# Patient Record
Sex: MALE | Race: WHITE | NOT HISPANIC OR LATINO | Employment: FULL TIME | URBAN - METROPOLITAN AREA
[De-identification: names, ages, dates, MRNs, and addresses within clinical notes are randomized per-mention and may not be internally consistent; named-entity substitution may affect disease eponyms.]

---

## 2019-06-10 ENCOUNTER — CLINICAL SUPPORT (OUTPATIENT)
Dept: OTHER | Facility: HOSPITAL | Age: 59
End: 2019-06-10

## 2019-06-10 ENCOUNTER — HOSPITAL ENCOUNTER (OUTPATIENT)
Dept: CT IMAGING | Facility: HOSPITAL | Age: 59
Discharge: HOME | End: 2019-06-10
Admitting: CLINICAL NURSE SPECIALIST

## 2019-06-10 DIAGNOSIS — Z87.891 HISTORY OF SMOKING 30 OR MORE PACK YEARS: ICD-10-CM

## 2019-06-10 DIAGNOSIS — Z12.2 SCREENING FOR MALIGNANT NEOPLASM OF RESPIRATORY ORGAN: ICD-10-CM

## 2019-06-10 DIAGNOSIS — Z12.2 SCREENING FOR MALIGNANT NEOPLASM OF RESPIRATORY ORGAN: Primary | ICD-10-CM

## 2019-06-10 PROCEDURE — G0296 VISIT TO DETERM LDCT ELIG: HCPCS | Performed by: CLINICAL NURSE SPECIALIST

## 2019-06-10 PROCEDURE — G0297 LDCT FOR LUNG CA SCREEN: HCPCS

## 2019-06-11 VITALS — HEIGHT: 60 IN | BODY MASS INDEX: 30.19 KG/M2 | WEIGHT: 153.8 LBS

## 2019-06-11 PROBLEM — Z87.891 FORMER CIGARETTE SMOKER: Status: ACTIVE | Noted: 2019-06-11

## 2019-06-11 NOTE — PROGRESS NOTES
[facility] Low-Dose Lung Cancer CT Screening Visit    Corby Martinez is a pleasant 59 y.o. male seen today at the request of Dr. Matthias Sheikh in our Lung Cancer Screening Program, being seen for Lung Cancer Screening Counseling and a Shared Decision Making Visit prior to Low-Dose Lung Cancer Screening CT exam.    SMOKING HISTORY:   Social History     Tobacco Use   Smoking Status Former Smoker   • Packs/day: 1.00   • Years: 36.00   • Pack years: 36.00   • Types: Cigarettes   • Last attempt to quit:    • Years since quittin.4   Tobacco Comment    Former smoker quit 8 years ago.  Began smoking at age 15 at 1ppd for 36 years.       Corby Martinez is a former smoker quitting 8 years ago with a 36 pack year history.  Reports no use of alternate forms of tobacco, electronic cigarettes, marijuana or other substances.  Based on the recommendation of the [place] Preventive Services Task Force, this patient is at high risk for lung cancer and a low-dose CT screening scan is recommended.     We discussed the connection between Radon and Lung Cancer and the availability of free test kits.  He has no basement in his home.  The patient reports occupational exposure to asbestos and various solvents and auto parts  in his work as a .  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  Some second-hand smoke exposure as a child with his father smoking in their home.  No current second-hand smoke exposure.    The patient has had no hemoptysis, unintentional weight loss or increasing shortness of breath. The patient is asymptomatic and has no signs or symptoms of lung cancer.   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  Additionally, reports no family history of lung cancer. He has no known chronic pulmonary disease.  We discussed findings of a CT chest on file from 2014  which showed no lung abnormalities.    Together we discussed the potential benefits and potential harms of being screened for lung cancer including the potential for follow-up diagnostic testing, risk for over diagnosis, false positive rate and total radiation exposure using the [facility] standardized decision aid.  We reviewed the patient's smoking history and counseled on the importance and health benefits of maintaining cigarette smoking abstinence.      Smoking Abstinence  DISCUSSION HELD TODAY:     We discussed that there are a number of resources and tobacco cessation interventions to assist with smoking cessation including the 1-800-Quit Now line, Health Department programs, [place] [facility] resources, and use of the [place] Department of Health and Human Services five keys for quitting and quit plan worksheet. On a scale of zero to ten, the patient rates their motivation to stay quit at a 10 out of 10 today.  The patient is confident that he will never smoke in the future.      Recommendations for continued lung cancer screening:      We discussed the NCCN guidelines for lung cancer screening and the patient verbalized understanding that annual screening is recommended until fifteen years beyond smoking as long as they have no other disease or comorbidity that would prevent them from receiving cancer treatments such as surgery should a lung cancer be detected. The [facility] Lung Cancer Screening Shared Decision-Making Tool was utilized as an aid in discussing whether or not screening was right. The patient has decided to proceed with a Low Dose Lung Cancer Screening CT today. The patient is aware that the results of his screening will be shared with the referring provider or PCP as well.       The patient verbalizes understanding that results of this low dose lung CT exam will be called and that assistance will be provided in arranging any necessary follow-up.    After review of  the NCCN guidelines and recommendations for ongoing screening, the patient verbalized understanding of recommendations for follow-up. We discussed the importance of adherence to continued annual screening until 15 years beyond smoking or until other life-limiting comorbidities are present. We discussed the impact of comorbidities and ability and willing to undergo diagnosis and treatment.    The patient was counseled on the continued health benefits of having quit tobacco, maintaining smoking abstinence, smoking cessation strategies and resources, as well as the importance of adherence to annual lung cancer low-dose CT screening.    Yamilet Jasmine, MSN, APRN, ACNS-BC, AOCN, CHPN  Clinical Nurse Specialist  [facility]

## 2019-06-11 NOTE — H&P (VIEW-ONLY)
[facility] Low-Dose Lung Cancer CT Screening Visit    Corby Martinez is a pleasant 59 y.o. male seen today at the request of Dr. Matthias Sheikh in our Lung Cancer Screening Program, being seen for Lung Cancer Screening Counseling and a Shared Decision Making Visit prior to Low-Dose Lung Cancer Screening CT exam.    SMOKING HISTORY:   Social History     Tobacco Use   Smoking Status Former Smoker   • Packs/day: 1.00   • Years: 36.00   • Pack years: 36.00   • Types: Cigarettes   • Last attempt to quit:    • Years since quittin.4   Tobacco Comment    Former smoker quit 8 years ago.  Began smoking at age 15 at 1ppd for 36 years.       Corby Martinez is a former smoker quitting 8 years ago with a 36 pack year history.  Reports no use of alternate forms of tobacco, electronic cigarettes, marijuana or other substances.  Based on the recommendation of the [place] Preventive Services Task Force, this patient is at high risk for lung cancer and a low-dose CT screening scan is recommended.     We discussed the connection between Radon and Lung Cancer and the availability of free test kits.  He has no basement in his home.  The patient reports occupational exposure to asbestos and various solvents and auto parts  in his work as a .  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  Some second-hand smoke exposure as a child with his father smoking in their home.  No current second-hand smoke exposure.    The patient has had no hemoptysis, unintentional weight loss or increasing shortness of breath. The patient is asymptomatic and has no signs or symptoms of lung cancer.   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  Additionally, reports no family history of lung cancer. He has no known chronic pulmonary disease.  We discussed findings of a CT chest on file from 2014  which showed no lung abnormalities.    Together we discussed the potential benefits and potential harms of being screened for lung cancer including the potential for follow-up diagnostic testing, risk for over diagnosis, false positive rate and total radiation exposure using the [facility] standardized decision aid.  We reviewed the patient's smoking history and counseled on the importance and health benefits of maintaining cigarette smoking abstinence.      Smoking Abstinence  DISCUSSION HELD TODAY:     We discussed that there are a number of resources and tobacco cessation interventions to assist with smoking cessation including the 1-800-Quit Now line, Health Department programs, [place] [facility] resources, and use of the [place] Department of Health and Human Services five keys for quitting and quit plan worksheet. On a scale of zero to ten, the patient rates their motivation to stay quit at a 10 out of 10 today.  The patient is confident that he will never smoke in the future.      Recommendations for continued lung cancer screening:      We discussed the NCCN guidelines for lung cancer screening and the patient verbalized understanding that annual screening is recommended until fifteen years beyond smoking as long as they have no other disease or comorbidity that would prevent them from receiving cancer treatments such as surgery should a lung cancer be detected. The [facility] Lung Cancer Screening Shared Decision-Making Tool was utilized as an aid in discussing whether or not screening was right. The patient has decided to proceed with a Low Dose Lung Cancer Screening CT today. The patient is aware that the results of his screening will be shared with the referring provider or PCP as well.       The patient verbalizes understanding that results of this low dose lung CT exam will be called and that assistance will be provided in arranging any necessary follow-up.    After review of  the NCCN guidelines and recommendations for ongoing screening, the patient verbalized understanding of recommendations for follow-up. We discussed the importance of adherence to continued annual screening until 15 years beyond smoking or until other life-limiting comorbidities are present. We discussed the impact of comorbidities and ability and willing to undergo diagnosis and treatment.    The patient was counseled on the continued health benefits of having quit tobacco, maintaining smoking abstinence, smoking cessation strategies and resources, as well as the importance of adherence to annual lung cancer low-dose CT screening.    Yamilet Jasmine, MSN, APRN, ACNS-BC, AOCN, CHPN  Clinical Nurse Specialist  [facility]

## 2019-06-12 ENCOUNTER — OFFICE VISIT (OUTPATIENT)
Dept: OTHER | Facility: HOSPITAL | Age: 59
End: 2019-06-12

## 2019-06-12 VITALS
BODY MASS INDEX: 29.88 KG/M2 | HEIGHT: 60 IN | OXYGEN SATURATION: 100 % | DIASTOLIC BLOOD PRESSURE: 96 MMHG | WEIGHT: 152.2 LBS | HEART RATE: 58 BPM | RESPIRATION RATE: 16 BRPM | SYSTOLIC BLOOD PRESSURE: 155 MMHG | TEMPERATURE: 98 F

## 2019-06-12 DIAGNOSIS — R91.1 LUNG NODULE: Primary | ICD-10-CM

## 2019-06-12 DIAGNOSIS — Z87.891 FORMER CIGARETTE SMOKER: ICD-10-CM

## 2019-06-12 PROBLEM — C44.629 SQUAMOUS CELL CARCINOMA OF SKIN OF LEFT HAND: Status: ACTIVE | Noted: 2019-06-12

## 2019-06-12 PROCEDURE — G0463 HOSPITAL OUTPT CLINIC VISIT: HCPCS

## 2019-06-12 PROCEDURE — 99204 OFFICE O/P NEW MOD 45 MIN: CPT | Performed by: THORACIC SURGERY (CARDIOTHORACIC VASCULAR SURGERY)

## 2019-06-12 RX ORDER — LOSARTAN POTASSIUM 50 MG/1
50 TABLET ORAL DAILY
Refills: 0 | COMMUNITY
Start: 2019-05-22 | End: 2021-03-01

## 2019-06-12 RX ORDER — MONTELUKAST SODIUM 10 MG/1
TABLET ORAL NIGHTLY
COMMUNITY
Start: 2019-05-14 | End: 2021-03-31

## 2019-06-12 RX ORDER — ROSUVASTATIN CALCIUM 10 MG/1
10 TABLET, COATED ORAL NIGHTLY
COMMUNITY
Start: 2019-05-14

## 2019-06-12 RX ORDER — HYDROCHLOROTHIAZIDE 25 MG/1
TABLET ORAL DAILY
Refills: 2 | COMMUNITY
Start: 2019-04-30 | End: 2021-08-11

## 2019-06-12 NOTE — PROGRESS NOTES
Subjective   Patient ID: Corby Martinez is a 59 y.o. male is being seen for consultation today at the request of Matthias Sheikh MD    History of Present Illness  Dear Colleague,  Corby Martinez was seen in our multidisciplinary lung cancer clinic today in consultation for a newly discovered right upper lobe lung nodule.  Mr. Martinez is a pleasant 59-year-old gentleman with an approximately 40-pack-year history of tobacco use who presented for low-dose CT screening secondary to his history.  On his low-dose CT scan, there is a 1.8 cm right upper lobe nodule concerning for bronchogenic malignancy.      Mr. Martinez has a history of a squamous cell carcinoma of the skin on his left hand.  He also has a family history of squamous cell carcinoma of the skin.  He otherwise has hypertension treated with medication.  He is still employed doing maintenance.  He denies short of breath but states with some exertion he becomes short of breath.  He has had a recent 5 pound weight loss.    The following portions of the patient's history were reviewed and updated as appropriate: allergies, current medications, past family history, past medical history, past social history, past surgical history and problem list.  Review of Systems   Constitution: Positive for weight loss.   HENT: Positive for congestion and tinnitus.    Eyes: Negative.    Cardiovascular: Negative.    Respiratory: Positive for snoring.    Endocrine: Negative.    Hematologic/Lymphatic: Negative.    Skin: Positive for skin cancer.   Musculoskeletal: Positive for back pain.   Gastrointestinal: Negative.    Genitourinary: Positive for urgency.   Neurological: Positive for light-headedness.   Psychiatric/Behavioral: Negative.    Allergic/Immunologic: Positive for environmental allergies.   All other systems reviewed and are negative.    Patient Active Problem List   Diagnosis   • Former cigarette smoker   • Squamous cell carcinoma of skin of left hand     Past  Medical History:   Diagnosis Date   • Hyperlipidemia    • Hypertension    • Lung mass    • Pneumonia      Past Surgical History:   Procedure Laterality Date   • HERNIA REPAIR       Family History   Problem Relation Age of Onset   • Skin cancer Mother      Social History     Socioeconomic History   • Marital status:      Spouse name: Not on file   • Number of children: Not on file   • Years of education: Not on file   • Highest education level: Not on file   Tobacco Use   • Smoking status: Former Smoker     Packs/day: 1.00     Years: 36.00     Pack years: 36.00     Types: Cigarettes     Last attempt to quit:      Years since quittin.4   • Tobacco comment: Former smoker quit 8 years ago.  Began smoking at age 15 at 1ppd for 36 years.   Substance and Sexual Activity   • Alcohol use: Yes     Alcohol/week: 10.8 oz     Types: 18 Cans of beer per week     Frequency: 4 or more times a week     Drinks per session: 3 or 4     Binge frequency: Never     Comment: Reports 18 beers weekly.   • Drug use: No   • Sexual activity: Defer       Current Outpatient Medications:   •  hydrochlorothiazide (HYDRODIURIL) 25 MG tablet, , Disp: , Rfl: 2  •  losartan (COZAAR) 50 MG tablet, Take 50 mg by mouth Daily., Disp: , Rfl: 0  •  montelukast (SINGULAIR) 10 MG tablet, , Disp: , Rfl:   •  rosuvastatin (CRESTOR) 10 MG tablet, , Disp: , Rfl:   No Known Allergies     Objective   Vitals:    19 0940   BP: 155/96   Pulse: 58   Resp: 16   Temp: 98 °F (36.7 °C)   SpO2: 100%     Physical Exam   Constitutional: He is oriented to person, place, and time. He appears well-developed and well-nourished.   HENT:   Head: Normocephalic and atraumatic.   Nose: Nose normal.   Mouth/Throat: Oropharynx is clear and moist.   Eyes: Conjunctivae and EOM are normal. Pupils are equal, round, and reactive to light.   Neck: Normal range of motion. Neck supple.   Cardiovascular: Normal rate, regular rhythm, normal heart sounds and intact distal  pulses.   Pulmonary/Chest: Effort normal and breath sounds normal.   Abdominal: Soft. Bowel sounds are normal.   Musculoskeletal: Normal range of motion.   Neurological: He is alert and oriented to person, place, and time.   Skin: Skin is warm and dry. Capillary refill takes less than 2 seconds.   Psychiatric: He has a normal mood and affect. His behavior is normal. Judgment and thought content normal.   Nursing note and vitals reviewed.    Independent Review of Radiographic Studies:    I have independently reviewed the CT chest performed on 6/10/2019 which demonstrates a 1.8 x 1.6 right upper lobe lung nodule with spiculated borders concerning for bronchogenic malignancy.  There is moderate emphysema.  No effusion, no hilar or mediastinal lymphadenopathy.  Assessment/Plan   Mr. Martinez is a pleasant 59-year-old gentleman with a newly discovered right upper lobe lung mass concerning for bronchogenic malignancy.  He is reasonably healthy and given its appearance on low-dose screening CT scan, this will likely represent an early stage malignancy.  He will need a CT guided biopsy of this lesion to provide a diagnosis as well as a PET CT scan to evaluate for metastatic disease.  I would also like to obtain PFTs to better risk stratify him for a possible surgical resection.  He will also need a stress echo for risk stratification.  Once he is completed all of the studies, he will plan to come back and see me in clinic to discuss further treatment options.        Diagnoses and all orders for this visit:    Lung nodule  -     NM Pet Skull Base To Mid Thigh; Future  -     Full Pulmonary Function Test With Bronchodilator; Future  -     Adult Stress Echo W/ Cont or Stress Agent if Necessary Per Protocol; Future  -     CT Biopsy Lung Mediastinum Right; Future  -     Tissue Pathology Exam; Standing    Former cigarette smoker    Other orders  -     rosuvastatin (CRESTOR) 10 MG tablet  -     montelukast (SINGULAIR) 10 MG  tablet  -     losartan (COZAAR) 50 MG tablet; Take 50 mg by mouth Daily.  -     hydrochlorothiazide (HYDRODIURIL) 25 MG tablet

## 2019-06-13 NOTE — PATIENT INSTRUCTIONS
Pt seen by Dr. Lee and will be scheduled for PFTs/CTneedle bx/PET scan and stress test. Pt given instructions for PFTs , CT needle bx/ PET scan and stress test.  Pt instructed to call nurse navigator with any further questions or concerns. Pt given contact cards for Dr. Lee and nurse navigator and will return to clinic when tests are completed.

## 2019-06-18 ENCOUNTER — HOSPITAL ENCOUNTER (OUTPATIENT)
Dept: PET IMAGING | Facility: HOSPITAL | Age: 59
Discharge: HOME | End: 2019-06-18

## 2019-06-18 ENCOUNTER — HOSPITAL ENCOUNTER (OUTPATIENT)
Dept: RESPIRATORY THERAPY | Facility: HOSPITAL | Age: 59
Discharge: HOME | End: 2019-06-18
Admitting: THORACIC SURGERY (CARDIOTHORACIC VASCULAR SURGERY)

## 2019-06-18 DIAGNOSIS — R91.1 LUNG NODULE: ICD-10-CM

## 2019-06-18 DIAGNOSIS — R06.09 DYSPNEA ON EXERTION: Primary | ICD-10-CM

## 2019-06-18 DIAGNOSIS — Z01.818 PRE-OPERATIVE CLEARANCE: ICD-10-CM

## 2019-06-18 LAB
BDY SITE: NORMAL
GLUCOSE BLDC GLUCOMTR-MCNC: 89 MG/DL (ref 70–130)
HGB BLDA-MCNC: 15 G/DL

## 2019-06-18 PROCEDURE — A9552 F18 FDG: HCPCS | Performed by: THORACIC SURGERY (CARDIOTHORACIC VASCULAR SURGERY)

## 2019-06-18 PROCEDURE — 82820 HEMOGLOBIN-OXYGEN AFFINITY: CPT | Performed by: THORACIC SURGERY (CARDIOTHORACIC VASCULAR SURGERY)

## 2019-06-18 PROCEDURE — 78815 PET IMAGE W/CT SKULL-THIGH: CPT

## 2019-06-18 PROCEDURE — 94726 PLETHYSMOGRAPHY LUNG VOLUMES: CPT

## 2019-06-18 PROCEDURE — 94640 AIRWAY INHALATION TREATMENT: CPT

## 2019-06-18 PROCEDURE — 82962 GLUCOSE BLOOD TEST: CPT

## 2019-06-18 PROCEDURE — 0 FLUDEOXYGLUCOSE F18 SOLUTION: Performed by: THORACIC SURGERY (CARDIOTHORACIC VASCULAR SURGERY)

## 2019-06-18 PROCEDURE — 94060 EVALUATION OF WHEEZING: CPT

## 2019-06-18 PROCEDURE — 94729 DIFFUSING CAPACITY: CPT

## 2019-06-18 RX ORDER — ALBUTEROL SULFATE 2.5 MG/3ML
2.5 SOLUTION RESPIRATORY (INHALATION) ONCE
Status: COMPLETED | OUTPATIENT
Start: 2019-06-18 | End: 2019-06-18

## 2019-06-18 RX ADMIN — FLUDEOXYGLUCOSE F18 1 DOSE: 300 INJECTION INTRAVENOUS at 08:50

## 2019-06-18 RX ADMIN — ALBUTEROL SULFATE 2.5 MG: 2.5 SOLUTION RESPIRATORY (INHALATION) at 11:25

## 2019-06-20 ENCOUNTER — HOSPITAL ENCOUNTER (OUTPATIENT)
Dept: CARDIOLOGY | Facility: HOSPITAL | Age: 59
Discharge: HOME | End: 2019-06-20
Admitting: THORACIC SURGERY (CARDIOTHORACIC VASCULAR SURGERY)

## 2019-06-20 ENCOUNTER — APPOINTMENT (OUTPATIENT)
Dept: CARDIOLOGY | Facility: HOSPITAL | Age: 59
End: 2019-06-20

## 2019-06-20 VITALS — HEIGHT: 69 IN | BODY MASS INDEX: 22.51 KG/M2 | WEIGHT: 152 LBS

## 2019-06-20 DIAGNOSIS — R91.1 LUNG NODULE: ICD-10-CM

## 2019-06-20 DIAGNOSIS — Z01.818 PRE-OPERATIVE CLEARANCE: ICD-10-CM

## 2019-06-20 DIAGNOSIS — R06.09 DYSPNEA ON EXERTION: ICD-10-CM

## 2019-06-20 LAB
BH CV ECHO MEAS - ACS: 1.9 CM
BH CV ECHO MEAS - AO MAX PG (FULL): 4.5 MMHG
BH CV ECHO MEAS - AO MAX PG: 8.1 MMHG
BH CV ECHO MEAS - AO MEAN PG (FULL): 2 MMHG
BH CV ECHO MEAS - AO MEAN PG: 4 MMHG
BH CV ECHO MEAS - AO ROOT AREA (BSA CORRECTED): 1.6
BH CV ECHO MEAS - AO ROOT AREA: 6.4 CM^2
BH CV ECHO MEAS - AO ROOT DIAM: 2.9 CM
BH CV ECHO MEAS - AO V2 MAX: 142 CM/SEC
BH CV ECHO MEAS - AO V2 MEAN: 88.3 CM/SEC
BH CV ECHO MEAS - AO V2 VTI: 30 CM
BH CV ECHO MEAS - AVA(I,A): 2.1 CM^2
BH CV ECHO MEAS - AVA(I,D): 2.1 CM^2
BH CV ECHO MEAS - AVA(V,A): 1.9 CM^2
BH CV ECHO MEAS - AVA(V,D): 1.9 CM^2
BH CV ECHO MEAS - BSA(HAYCOCK): 1.8 M^2
BH CV ECHO MEAS - BSA: 1.8 M^2
BH CV ECHO MEAS - BZI_BMI: 22.4 KILOGRAMS/M^2
BH CV ECHO MEAS - BZI_METRIC_HEIGHT: 175.3 CM
BH CV ECHO MEAS - BZI_METRIC_WEIGHT: 68.9 KG
BH CV ECHO MEAS - EDV(CUBED): 65.5 ML
BH CV ECHO MEAS - EDV(MOD-SP2): 82.5 ML
BH CV ECHO MEAS - EDV(MOD-SP4): 82.7 ML
BH CV ECHO MEAS - EDV(TEICH): 71.3 ML
BH CV ECHO MEAS - EF(CUBED): 67.9 %
BH CV ECHO MEAS - EF(MOD-BP): 61 %
BH CV ECHO MEAS - EF(MOD-SP2): 69 %
BH CV ECHO MEAS - EF(MOD-SP4): 59 %
BH CV ECHO MEAS - EF(TEICH): 60 %
BH CV ECHO MEAS - ESV(CUBED): 21 ML
BH CV ECHO MEAS - ESV(MOD-SP2): 25.6 ML
BH CV ECHO MEAS - ESV(MOD-SP4): 19 ML
BH CV ECHO MEAS - ESV(TEICH): 28.5 ML
BH CV ECHO MEAS - FS: 31.5 %
BH CV ECHO MEAS - IVS/LVPW: 1.1
BH CV ECHO MEAS - IVSD: 0.92 CM
BH CV ECHO MEAS - LA DIMENSION: 2.5 CM
BH CV ECHO MEAS - LA/AO: 0.88
BH CV ECHO MEAS - LAT PEAK E' VEL: 8 CM/SEC
BH CV ECHO MEAS - LV DIASTOLIC VOL/BSA (35-75): 45 ML/M^2
BH CV ECHO MEAS - LV MASS(C)D: 105.9 GRAMS
BH CV ECHO MEAS - LV MASS(C)DI: 57.6 GRAMS/M^2
BH CV ECHO MEAS - LV MAX PG: 3.6 MMHG
BH CV ECHO MEAS - LV MEAN PG: 2 MMHG
BH CV ECHO MEAS - LV SYSTOLIC VOL/BSA (12-30): 10.3 ML/M^2
BH CV ECHO MEAS - LV V1 MAX: 94.6 CM/SEC
BH CV ECHO MEAS - LV V1 MEAN: 60.4 CM/SEC
BH CV ECHO MEAS - LV V1 VTI: 21.8 CM
BH CV ECHO MEAS - LVIDD: 4 CM
BH CV ECHO MEAS - LVIDS: 2.8 CM
BH CV ECHO MEAS - LVLD AP2: 5.9 CM
BH CV ECHO MEAS - LVLD AP4: 6.8 CM
BH CV ECHO MEAS - LVLS AP2: 5.7 CM
BH CV ECHO MEAS - LVLS AP4: 5.1 CM
BH CV ECHO MEAS - LVOT AREA (M): 2.8 CM^2
BH CV ECHO MEAS - LVOT AREA: 2.8 CM^2
BH CV ECHO MEAS - LVOT DIAM: 1.9 CM
BH CV ECHO MEAS - LVPWD: 0.82 CM
BH CV ECHO MEAS - MED PEAK E' VEL: 8 CM/SEC
BH CV ECHO MEAS - MV A DUR: 0.11 SEC
BH CV ECHO MEAS - MV A MAX VEL: 59.2 CM/SEC
BH CV ECHO MEAS - MV DEC SLOPE: 343 CM/SEC^2
BH CV ECHO MEAS - MV DEC TIME: 0.18 SEC
BH CV ECHO MEAS - MV E MAX VEL: 67.4 CM/SEC
BH CV ECHO MEAS - MV E/A: 1.1
BH CV ECHO MEAS - MV MAX PG: 3.4 MMHG
BH CV ECHO MEAS - MV MEAN PG: 1 MMHG
BH CV ECHO MEAS - MV P1/2T MAX VEL: 93.7 CM/SEC
BH CV ECHO MEAS - MV P1/2T: 80 MSEC
BH CV ECHO MEAS - MV V2 MAX: 91.8 CM/SEC
BH CV ECHO MEAS - MV V2 MEAN: 55.4 CM/SEC
BH CV ECHO MEAS - MV V2 VTI: 28.2 CM
BH CV ECHO MEAS - MVA P1/2T LCG: 2.3 CM^2
BH CV ECHO MEAS - MVA(P1/2T): 2.7 CM^2
BH CV ECHO MEAS - MVA(VTI): 2.2 CM^2
BH CV ECHO MEAS - PA ACC TIME: 0.16 SEC
BH CV ECHO MEAS - PA MAX PG (FULL): 1.5 MMHG
BH CV ECHO MEAS - PA MAX PG: 3.1 MMHG
BH CV ECHO MEAS - PA PR(ACCEL): 7.9 MMHG
BH CV ECHO MEAS - PA V2 MAX: 88.2 CM/SEC
BH CV ECHO MEAS - PULM A REVS DUR: 0.11 SEC
BH CV ECHO MEAS - PULM A REVS VEL: 32.5 CM/SEC
BH CV ECHO MEAS - PULM DIAS VEL: 36.9 CM/SEC
BH CV ECHO MEAS - PULM S/D: 0.64
BH CV ECHO MEAS - PULM SYS VEL: 23.8 CM/SEC
BH CV ECHO MEAS - PVA(V,A): 2 CM^2
BH CV ECHO MEAS - PVA(V,D): 2 CM^2
BH CV ECHO MEAS - QP/QS: 0.58
BH CV ECHO MEAS - RAP SYSTOLE: 3 MMHG
BH CV ECHO MEAS - RV MAX PG: 1.6 MMHG
BH CV ECHO MEAS - RV MEAN PG: 1 MMHG
BH CV ECHO MEAS - RV V1 MAX: 63.5 CM/SEC
BH CV ECHO MEAS - RV V1 MEAN: 41.8 CM/SEC
BH CV ECHO MEAS - RV V1 VTI: 12.6 CM
BH CV ECHO MEAS - RVOT AREA: 2.8 CM^2
BH CV ECHO MEAS - RVOT DIAM: 1.9 CM
BH CV ECHO MEAS - RVSP: 25 MMHG
BH CV ECHO MEAS - SI(AO): 104.1 ML/M^2
BH CV ECHO MEAS - SI(CUBED): 24.2 ML/M^2
BH CV ECHO MEAS - SI(LVOT): 33.6 ML/M^2
BH CV ECHO MEAS - SI(MOD-SP2): 31 ML/M^2
BH CV ECHO MEAS - SI(MOD-SP4): 34.7 ML/M^2
BH CV ECHO MEAS - SI(TEICH): 23.2 ML/M^2
BH CV ECHO MEAS - SV(AO): 191.4 ML
BH CV ECHO MEAS - SV(CUBED): 44.4 ML
BH CV ECHO MEAS - SV(LVOT): 61.8 ML
BH CV ECHO MEAS - SV(MOD-SP2): 56.9 ML
BH CV ECHO MEAS - SV(MOD-SP4): 63.7 ML
BH CV ECHO MEAS - SV(RVOT): 35.7 ML
BH CV ECHO MEAS - SV(TEICH): 42.7 ML
BH CV ECHO MEAS - TAPSE (>1.6): 2.6 CM2
BH CV ECHO MEAS - TR MAX VEL: 246.5 CM/SEC
BH CV ECHO MEASUREMENTS AVERAGE E/E' RATIO: 8.43
BH CV STRESS BP STAGE 1: NORMAL
BH CV STRESS BP STAGE 2: NORMAL
BH CV STRESS BP STAGE 3: NORMAL
BH CV STRESS DURATION MIN STAGE 1: 3
BH CV STRESS DURATION MIN STAGE 2: 3
BH CV STRESS DURATION MIN STAGE 3: 1
BH CV STRESS DURATION SEC STAGE 1: 0
BH CV STRESS DURATION SEC STAGE 2: 0
BH CV STRESS DURATION SEC STAGE 3: 45
BH CV STRESS ECHO POST STRESS EJECTION FRACTION EF: 74 %
BH CV STRESS GRADE STAGE 1: 10
BH CV STRESS GRADE STAGE 2: 12
BH CV STRESS GRADE STAGE 3: 14
BH CV STRESS HR STAGE 1: 92
BH CV STRESS HR STAGE 2: 120
BH CV STRESS HR STAGE 3: 156
BH CV STRESS METS STAGE 1: 5
BH CV STRESS METS STAGE 2: 7.5
BH CV STRESS METS STAGE 3: 10
BH CV STRESS PROTOCOL 1: NORMAL
BH CV STRESS RECOVERY BP: NORMAL MMHG
BH CV STRESS RECOVERY HR: 80 BPM
BH CV STRESS SPEED STAGE 1: 1.7
BH CV STRESS SPEED STAGE 2: 2.5
BH CV STRESS SPEED STAGE 3: 3.4
BH CV STRESS STAGE 1: 1
BH CV STRESS STAGE 2: 2
BH CV STRESS STAGE 3: 3
BH CV VAS BP RIGHT ARM: NORMAL MMHG
BH CV XLRA - RV BASE: 4.3 CM
BH CV XLRA - TDI S': 13 CM/SEC
LEFT ATRIUM VOLUME INDEX: 19 ML/M2
LV EF 2D ECHO EST: 61 %
MAXIMAL PREDICTED HEART RATE: 160 BPM
PERCENT MAX PREDICTED HR: 97.5 %
STRESS BASELINE BP: NORMAL MMHG
STRESS BASELINE HR: 61 BPM
STRESS O2 SAT REST: 100 %
STRESS PERCENT HR: 115 %
STRESS POST ESTIMATED WORKLOAD: 9.8 METS
STRESS POST EXERCISE DUR MIN: 7 MIN
STRESS POST EXERCISE DUR SEC: 45 SEC
STRESS POST O2 SAT PEAK: 100 %
STRESS POST PEAK BP: NORMAL MMHG
STRESS POST PEAK HR: 156 BPM
STRESS TARGET HR: 136 BPM

## 2019-06-20 PROCEDURE — 93350 STRESS TTE ONLY: CPT | Performed by: INTERNAL MEDICINE

## 2019-06-20 PROCEDURE — 93320 DOPPLER ECHO COMPLETE: CPT

## 2019-06-20 PROCEDURE — 93017 CV STRESS TEST TRACING ONLY: CPT

## 2019-06-20 PROCEDURE — 93016 CV STRESS TEST SUPVJ ONLY: CPT | Performed by: INTERNAL MEDICINE

## 2019-06-20 PROCEDURE — 93325 DOPPLER ECHO COLOR FLOW MAPG: CPT

## 2019-06-20 PROCEDURE — 93018 CV STRESS TEST I&R ONLY: CPT | Performed by: INTERNAL MEDICINE

## 2019-06-20 PROCEDURE — 93350 STRESS TTE ONLY: CPT

## 2019-06-20 PROCEDURE — 93325 DOPPLER ECHO COLOR FLOW MAPG: CPT | Performed by: INTERNAL MEDICINE

## 2019-06-20 PROCEDURE — 25010000002 PERFLUTREN (DEFINITY) 8.476 MG IN SODIUM CHLORIDE 0.9 % 10 ML INJECTION: Performed by: THORACIC SURGERY (CARDIOTHORACIC VASCULAR SURGERY)

## 2019-06-20 PROCEDURE — 93352 ADMIN ECG CONTRAST AGENT: CPT | Performed by: INTERNAL MEDICINE

## 2019-06-20 PROCEDURE — 93320 DOPPLER ECHO COMPLETE: CPT | Performed by: INTERNAL MEDICINE

## 2019-06-20 RX ADMIN — PERFLUTREN 5 ML: 6.52 INJECTION, SUSPENSION INTRAVENOUS at 09:20

## 2019-06-24 ENCOUNTER — HOSPITAL ENCOUNTER (OUTPATIENT)
Dept: GENERAL RADIOLOGY | Facility: HOSPITAL | Age: 59
Discharge: HOME | End: 2019-06-24

## 2019-06-24 ENCOUNTER — HOSPITAL ENCOUNTER (OUTPATIENT)
Dept: CT IMAGING | Facility: HOSPITAL | Age: 59
Discharge: HOME | End: 2019-06-24
Admitting: THORACIC SURGERY (CARDIOTHORACIC VASCULAR SURGERY)

## 2019-06-24 VITALS
OXYGEN SATURATION: 96 % | WEIGHT: 165 LBS | SYSTOLIC BLOOD PRESSURE: 129 MMHG | RESPIRATION RATE: 16 BRPM | TEMPERATURE: 97.2 F | HEART RATE: 57 BPM | BODY MASS INDEX: 24.44 KG/M2 | HEIGHT: 69 IN | DIASTOLIC BLOOD PRESSURE: 74 MMHG

## 2019-06-24 DIAGNOSIS — R91.1 LUNG NODULE: ICD-10-CM

## 2019-06-24 LAB
INR PPP: 1 (ref 0.8–1.2)
PROTHROMBIN TIME: 12.5 SECONDS (ref 12.8–15.2)

## 2019-06-24 PROCEDURE — 77012 CT SCAN FOR NEEDLE BIOPSY: CPT

## 2019-06-24 PROCEDURE — 71045 X-RAY EXAM CHEST 1 VIEW: CPT

## 2019-06-24 PROCEDURE — 88305 TISSUE EXAM BY PATHOLOGIST: CPT | Performed by: THORACIC SURGERY (CARDIOTHORACIC VASCULAR SURGERY)

## 2019-06-24 PROCEDURE — 85610 PROTHROMBIN TIME: CPT

## 2019-06-24 RX ORDER — SODIUM CHLORIDE 0.9 % (FLUSH) 0.9 %
3 SYRINGE (ML) INJECTION EVERY 12 HOURS SCHEDULED
Status: DISCONTINUED | OUTPATIENT
Start: 2019-06-24 | End: 2019-06-25 | Stop reason: HOSPADM

## 2019-06-24 RX ORDER — SODIUM CHLORIDE 0.9 % (FLUSH) 0.9 %
3-10 SYRINGE (ML) INJECTION AS NEEDED
Status: DISCONTINUED | OUTPATIENT
Start: 2019-06-24 | End: 2019-06-25 | Stop reason: HOSPADM

## 2019-06-24 RX ORDER — SODIUM CHLORIDE 9 MG/ML
500 INJECTION, SOLUTION INTRAVENOUS CONTINUOUS
Status: DISCONTINUED | OUTPATIENT
Start: 2019-06-24 | End: 2019-06-25 | Stop reason: HOSPADM

## 2019-06-24 RX ORDER — ACETAMINOPHEN 325 MG/1
650 TABLET ORAL ONCE
Status: COMPLETED | OUTPATIENT
Start: 2019-06-24 | End: 2019-06-24

## 2019-06-24 RX ORDER — SODIUM CHLORIDE 0.9 % (FLUSH) 0.9 %
1-10 SYRINGE (ML) INJECTION AS NEEDED
Status: DISCONTINUED | OUTPATIENT
Start: 2019-06-24 | End: 2019-06-25 | Stop reason: HOSPADM

## 2019-06-24 RX ORDER — HYDROCODONE BITARTRATE AND ACETAMINOPHEN 5; 325 MG/1; MG/1
1 TABLET ORAL ONCE AS NEEDED
Status: COMPLETED | OUTPATIENT
Start: 2019-06-24 | End: 2019-06-24

## 2019-06-24 RX ORDER — LIDOCAINE HYDROCHLORIDE 20 MG/ML
10 INJECTION, SOLUTION INFILTRATION; PERINEURAL ONCE
Status: COMPLETED | OUTPATIENT
Start: 2019-06-24 | End: 2019-06-24

## 2019-06-24 RX ADMIN — ACETAMINOPHEN 650 MG: 325 TABLET ORAL at 11:50

## 2019-06-24 RX ADMIN — LIDOCAINE HYDROCHLORIDE 10 ML: 20 INJECTION, SOLUTION INFILTRATION; PERINEURAL at 11:41

## 2019-06-24 RX ADMIN — HYDROCODONE BITARTATE AND ACETAMINOPHEN 1 TABLET: 5; 325 TABLET ORAL at 13:10

## 2019-06-24 RX ADMIN — SODIUM CHLORIDE 500 ML: 0.9 INJECTION, SOLUTION INTRAVENOUS at 12:00

## 2019-06-24 NOTE — INTERVAL H&P NOTE
H&P reviewed. The patient was examined and there are no changes to the H&P.   Risks discussed included but not limited to pain, bleeding, infection, damaging surrounding structures (including pneumothorax requiring a chest tube and hospital admission and which could result in cardiopulmonary arrest) and need for further procedures/non diagnostic biopsy. Denied anticoagulation

## 2019-06-24 NOTE — DISCHARGE INSTRUCTIONS
EDUCATION /DISCHARGE INSTRUCTIONS  CT/US guided biopsy:  A biopsy is a procedure done to remove tissue for further analysis.  Before images are taken to locate the target area.  Images can be obtained using ultrasound, CT or MRI.  A physician will clean your skin with antiseptic soap, place a sterile towel around the site and administer a local anesthetic to numb the area.  The physician will then insert a special needle.  Sometimes images are taken of the needle after it is inserted to ensure the needle is in the correct area to be biopsied.   A sample is obtained and sent to the laboratory for study.  Occasionally the laboratory is unable to make a diagnosis from the sample and the procedure may need to be repeated.  Within a week the radiologist will send a report to your physician.  A pathologist will also examine the tissue and send a report.      Risks of the procedure include but are not limited to:   *  Bleeding    *  Infection   *  Puncture of surrounding organs *  Death     *  Lung collapse if the biopsy is near the chest which may require insertion of a       tube to re-inflate the lung if severe.      Benefits of the procedure:  Using x-ray helps to locate the area that requires a biopsy. The procedure is less invasive than a surgical procedure, there are no large incisions and it does not require anesthesia.      Alternatives to the procedure:  A biopsy can be performed surgically.  Risks of a surgical biopsy include exposure to anesthesia, infection, excessive bleeding and injury to abdominal organs.  A benefit of surgical biopsy is the ability to see the area to be biopsied and remove of a larger piece of tissue.    THIS EDUCATION INFORMATION WAS REVIEWED PRIOR TO PROCEDURE AND CONSENT. Patient initials__________________Time________1040___________    Post Procedure:    *  Expect the biopsy site may be tender up to one week.    *  Rest today (no pushing pulling or straining).   *  Slowly increase  activity tomorrow.    *  If you received sedation do not drive for 24 hours.   *  Keep dressing clean and dry.   *  Leave dressing on puncture site for 24 hours.    *  You may shower when dressing removed.    Call your doctor if experiencing:   *  Signs of infection such as redness, swelling, excessive pain and / or foul        smelling drainage from the puncture site.   *  Chills or fever over 101 degrees (by mouth).   *  Unrelieved pain.   *  Any new or severe symptoms.   *  If experiencing sudden / severe shortness of breath or chest pain go to the       nearest emergency room.     Following the procedure:     Follow-up with the ordering physician as directed.    Continue to take other medications as directed by your physician unless    otherwise instructed.    If you have any concerns please call the Radiology Nurses Desk at [phone].  You are the most important factor in your recovery.  Follow the above instructions carefully.

## 2019-06-24 NOTE — H&P
Name: Corby Martinez ADMIT: 2019   : 1960  PCP: Matthias Sheikh MD    MRN: [id number] LOS: 0 days   AGE/SEX: 59 y.o. male  ROOM: Room/bed info not found       Chief complaint RIGHT LUNG LESION    Present Illness or Internal History:  Patient is a 59 y.o. male presents with RIGHT LUNG LESION.     Past Surgical History:  Past Surgical History:   Procedure Laterality Date   • HERNIA REPAIR         Past Medical History:  Past Medical History:   Diagnosis Date   • Hyperlipidemia    • Hypertension    • Lung mass    • Pneumonia        Home Medications:    (Not in a hospital admission)    Allergies:  Patient has no known allergies.    Family History:  Family History   Problem Relation Age of Onset   • Skin cancer Mother        Social History:  Social History     Tobacco Use   • Smoking status: Former Smoker     Packs/day: 1.00     Years: 36.00     Pack years: 36.00     Types: Cigarettes     Last attempt to quit:      Years since quittin.4   • Tobacco comment: Former smoker quit 8 years ago.  Began smoking at age 15 at 1ppd for 36 years.   Substance Use Topics   • Alcohol use: Yes     Alcohol/week: 10.8 oz     Types: 18 Cans of beer per week     Frequency: 4 or more times a week     Drinks per session: 3 or 4     Binge frequency: Never     Comment: Reports 18 beers weekly.   • Drug use: No        Objective     Physical Exam:      General Appearance:    Alert, cooperative, in no acute distress   Head:    Normocephalic, without obvious abnormality, atraumatic   Eyes:            Lids and lashes normal, conjunctivae and sclerae normal, no   icterus, no pallor, corneas clear, PERRLA   Ears:    Ears appear intact with no abnormalities noted   Throat:   No oral lesions, no thrush, oral mucosa moist   Neck:   No adenopathy, supple, trachea midline, no thyromegaly, no   carotid bruit, no JVD   Back:     No kyphosis present, no scoliosis present, no skin lesions,      erythema or scars, no tenderness  to percussion or                   palpation,   range of motion normal   Lungs:     Clear to auscultation,respirations regular, even and                  unlabored    Heart:    Regular rhythm and normal rate, normal S1 and S2, no            murmur, no gallop, no rub, no click   Chest Wall:    No abnormalities observed   Abdomen:     Normal bowel sounds, no masses, no organomegaly, soft        non-tender, non-distended, no guarding, no rebound                tenderness   Rectal:     Deferred   Extremities:   Moves all extremities well, no edema, no cyanosis, no             redness   Pulses:   Pulses palpable and equal bilaterally   Skin:   No bleeding, bruising or rash   Lymph nodes:   No palpable adenopathy   Neurologic:   Cranial nerves 2 - 12 grossly intact, sensation intact, DTR       present and equal bilaterally       Vital Signs  Temp:  [97.2 °F (36.2 °C)] 97.2 °F (36.2 °C)  Heart Rate:  [60] 60  Resp:  [18] 18  BP: (138)/(89) 138/89    Anticipated Surgical Procedure:  CT RIGHT LUNG BIOPSY    The risks, benefits and alternatives of this procedure have been discussed with the patient or responsible party: Yes        Jordan Ventura MD  06/24/19  11:14 AM

## 2019-06-24 NOTE — NURSING NOTE
Dr. Ventura here to see patient, ok'd for him to go home. D/C instructions discussed and understood by patient and family member. Dressing dry and intact. No SOA. No distress. Home per vehicle.

## 2019-06-24 NOTE — NURSING NOTE
Returned from CT lung biopsy. C/o pain in right pectoral area, 5/ 10. Tylenol 650 mg given. Dressing to puncture site bloodied, new gauze applied.

## 2019-06-24 NOTE — NURSING NOTE
"Patient states \"I don't feel well\" color pale, sweaty, VSS low. No soa or increased pain noted, placed in slight trendelenburg , cool cloth applied, IVF bolus started.  "

## 2019-06-25 ENCOUNTER — TELEPHONE (OUTPATIENT)
Dept: INTERVENTIONAL RADIOLOGY/VASCULAR | Facility: HOSPITAL | Age: 59
End: 2019-06-25

## 2019-06-25 LAB
CYTO UR: NORMAL
LAB AP CASE REPORT: NORMAL
LAB AP CLINICAL INFORMATION: NORMAL
LAB AP DIAGNOSIS COMMENT: NORMAL
PATH REPORT.FINAL DX SPEC: NORMAL
PATH REPORT.GROSS SPEC: NORMAL

## 2019-06-26 ENCOUNTER — OFFICE VISIT (OUTPATIENT)
Dept: OTHER | Facility: HOSPITAL | Age: 59
End: 2019-06-26

## 2019-06-26 DIAGNOSIS — R91.1 LUNG NODULE: Primary | ICD-10-CM

## 2019-06-26 PROCEDURE — G0463 HOSPITAL OUTPT CLINIC VISIT: HCPCS

## 2019-06-26 PROCEDURE — 99214 OFFICE O/P EST MOD 30 MIN: CPT | Performed by: THORACIC SURGERY (CARDIOTHORACIC VASCULAR SURGERY)

## 2019-06-26 NOTE — PROGRESS NOTES
Subjective   Patient ID: Corby Martinez is a 59 y.o. male is here today for follow-up.    History of Present Illness  Dear Colleague,  Corby Martinez was seen in our multidisciplinary lung cancer clinic today in follow up for a newly discovered right  upper lobe lung nodule.  Mr. Martinez is a pleasant 59-year-old gentleman with an approximately 40-pack-year history of tobacco use who presented for low-dose CT screening secondary to his history.  On his low-dose CT scan, there is a 1.8 cm right upper lobe nodule concerning for bronchogenic malignancy.       Mr. Martinez has a history of a squamous cell carcinoma of the skin on his left hand.  He also has a family history of squamous cell carcinoma of the skin.  He otherwise has hypertension treated with medication.  He is still employed doing maintenance.  He denies short of breath but states with some exertion he becomes short of breath.  He has had a recent 5 pound weight loss.    He presents today to discuss his recent CT guided biopsy and PET CT scan.   He has no new complaints.   The following portions of the patient's history were reviewed and updated as appropriate: allergies, current medications, past family history, past medical history, past social history, past surgical history and problem list.  Review of Systems   Constitution: Positive for weight loss.   HENT: Positive for congestion.    Eyes: Negative.    Cardiovascular: Negative.    Respiratory: Positive for snoring.    Endocrine: Negative.    Hematologic/Lymphatic: Negative.    Skin: Positive for skin cancer.   Musculoskeletal: Positive for back pain.   Gastrointestinal: Negative.    Genitourinary: Negative.    Neurological: Positive for light-headedness.   Psychiatric/Behavioral: Negative.    Allergic/Immunologic: Negative.    All other systems reviewed and are negative.    Patient Active Problem List   Diagnosis   • Former cigarette smoker   • Squamous cell carcinoma of skin of left hand      Past Medical History:   Diagnosis Date   • Hyperlipidemia    • Hypertension    • Lung mass    • Pneumonia      Past Surgical History:   Procedure Laterality Date   • HERNIA REPAIR       Family History   Problem Relation Age of Onset   • Skin cancer Mother      Social History     Socioeconomic History   • Marital status:      Spouse name: Not on file   • Number of children: Not on file   • Years of education: Not on file   • Highest education level: Not on file   Tobacco Use   • Smoking status: Former Smoker     Packs/day: 1.00     Years: 36.00     Pack years: 36.00     Types: Cigarettes     Last attempt to quit:      Years since quittin.4   • Tobacco comment: Former smoker quit 8 years ago.  Began smoking at age 15 at 1ppd for 36 years.   Substance and Sexual Activity   • Alcohol use: Yes     Alcohol/week: 10.8 oz     Types: 18 Cans of beer per week     Frequency: 4 or more times a week     Drinks per session: 3 or 4     Binge frequency: Never     Comment: Reports 18 beers weekly.   • Drug use: No   • Sexual activity: Defer       Current Outpatient Medications:   •  hydrochlorothiazide (HYDRODIURIL) 25 MG tablet, Daily., Disp: , Rfl: 2  •  losartan (COZAAR) 50 MG tablet, Take 50 mg by mouth Daily., Disp: , Rfl: 0  •  montelukast (SINGULAIR) 10 MG tablet, Every Night., Disp: , Rfl:   •  rosuvastatin (CRESTOR) 10 MG tablet, 10 mg Every Night., Disp: , Rfl:   No Known Allergies     Objective   There were no vitals filed for this visit.  Physical Exam   Constitutional: He is oriented to person, place, and time. He appears well-developed and well-nourished.   HENT:   Head: Normocephalic and atraumatic.   Nose: Nose normal.   Mouth/Throat: Oropharynx is clear and moist.   Eyes: Conjunctivae and EOM are normal. Pupils are equal, round, and reactive to light.   Neck: Normal range of motion. Neck supple.   Cardiovascular: Normal rate, regular rhythm, normal heart sounds and intact distal pulses.    Pulmonary/Chest: Effort normal and breath sounds normal.   Abdominal: Soft. Bowel sounds are normal.   Musculoskeletal: Normal range of motion.   Neurological: He is alert and oriented to person, place, and time.   Skin: Skin is warm and dry. Capillary refill takes less than 2 seconds.   Psychiatric: He has a normal mood and affect. His behavior is normal. Judgment and thought content normal.   Nursing note and vitals reviewed.    Independent Review of Radiographic Studies:    I have independently reviewed the PET CT scan performed on 6/12/2019 which demonstrates a hypermetabolic 1.7 cm pleural-based nodular opacity in the right upper lobe.  There are no hypermetabolic lesions in the chest abdomen or pelvis.  CT-guided biopsy: Mixed inflammation with fibrosis and loose fibromyxoid plugs suggesting organizing bronchopneumonia.  No evidence of malignancy.    Assessment/Plan   Mr. Martinez is a pleasant 59-year-old gentleman with a right upper lobe 1.7 cm nodule.  This is hypermetabolic on PET CT scan and the biopsy is consistent with an organizing pneumonia.  This nodule is likely of benign etiology, however, given its size he will need close surveillance with a CT chest in 3 months to document stability.    Diagnoses and all orders for this visit:    Lung nodule  -     CT Chest Without Contrast; Future

## 2019-06-26 NOTE — PATIENT INSTRUCTIONS
Pt seen by Dr. Lee will be scheduled for a CT chest in 3 mos and will follow up in the office. Pt left before meeting with nurse navigator. Pt was given contact card for Dr. Lee and nurse navigator at prior appointment.

## 2019-07-03 ENCOUNTER — APPOINTMENT (OUTPATIENT)
Dept: CARDIOLOGY | Facility: HOSPITAL | Age: 59
End: 2019-07-03

## 2019-09-18 ENCOUNTER — HOSPITAL ENCOUNTER (OUTPATIENT)
Dept: CT IMAGING | Facility: HOSPITAL | Age: 59
Discharge: HOME | End: 2019-09-18
Admitting: THORACIC SURGERY (CARDIOTHORACIC VASCULAR SURGERY)

## 2019-09-18 DIAGNOSIS — R91.1 LUNG NODULE: ICD-10-CM

## 2019-09-18 PROCEDURE — 71250 CT THORAX DX C-: CPT

## 2019-09-23 ENCOUNTER — OFFICE VISIT (OUTPATIENT)
Dept: SURGERY | Facility: CLINIC | Age: 59
End: 2019-09-23

## 2019-09-23 VITALS
DIASTOLIC BLOOD PRESSURE: 82 MMHG | HEART RATE: 58 BPM | OXYGEN SATURATION: 99 % | SYSTOLIC BLOOD PRESSURE: 142 MMHG | BODY MASS INDEX: 22.22 KG/M2 | WEIGHT: 150 LBS | HEIGHT: 69 IN

## 2019-09-23 DIAGNOSIS — R91.1 LUNG NODULE: Primary | ICD-10-CM

## 2019-09-23 DIAGNOSIS — R06.09 DYSPNEA ON EXERTION: ICD-10-CM

## 2019-09-23 PROCEDURE — 99213 OFFICE O/P EST LOW 20 MIN: CPT | Performed by: THORACIC SURGERY (CARDIOTHORACIC VASCULAR SURGERY)

## 2019-09-23 NOTE — PROGRESS NOTES
Subjective   Patient ID: Corby Martinez is a 59 y.o. male is here today for follow-up.    History of Present Illness  Dear Colleague,  Corby Martinez was seen in our office today for continued follow up and surveillance for a lung nodule.  Mr. Martinez is a pleasant 59-year-old gentleman with an approximately 40-pack-year history of tobacco use who presented for low-dose CT screening secondary to his history.  On his low-dose CT scan, there is a 1.8 cm right upper lobe nodule concerning for bronchogenic malignancy.  He underwent a CT-guided biopsy which demonstrated inflammation.    Mr. Martinez presents today with his recent surveillance CT scan.  He has no new complaints at this time.  The following portions of the patient's history were reviewed and updated as appropriate: allergies, current medications, past family history, past medical history, past social history, past surgical history and problem list.  Review of Systems   Constitution: Negative.   HENT: Negative.    Eyes: Negative.    Cardiovascular: Negative.    Respiratory: Negative.    Endocrine: Negative.    Hematologic/Lymphatic: Negative.    Skin: Negative.    Musculoskeletal: Negative.    Gastrointestinal: Negative.    Genitourinary: Negative.    Neurological: Negative.    Psychiatric/Behavioral: Negative.    Allergic/Immunologic: Negative.      Patient Active Problem List   Diagnosis   • Former cigarette smoker   • Squamous cell carcinoma of skin of left hand     Past Medical History:   Diagnosis Date   • Hyperlipidemia    • Hypertension    • Lung mass    • Pneumonia      Past Surgical History:   Procedure Laterality Date   • HERNIA REPAIR       Family History   Problem Relation Age of Onset   • Skin cancer Mother      Social History     Socioeconomic History   • Marital status:      Spouse name: Not on file   • Number of children: Not on file   • Years of education: Not on file   • Highest education level: Not on file   Tobacco Use   •  Smoking status: Former Smoker     Packs/day: 1.00     Years: 36.00     Pack years: 36.00     Types: Cigarettes     Last attempt to quit: 2011     Years since quittin.7   • Tobacco comment: Former smoker quit 8 years ago.  Began smoking at age 15 at 1ppd for 36 years.   Substance and Sexual Activity   • Alcohol use: Yes     Alcohol/week: 10.8 oz     Types: 18 Cans of beer per week     Frequency: 4 or more times a week     Drinks per session: 3 or 4     Binge frequency: Never     Comment: Reports 18 beers weekly.   • Drug use: No   • Sexual activity: Defer       Current Outpatient Medications:   •  hydrochlorothiazide (HYDRODIURIL) 25 MG tablet, Daily., Disp: , Rfl: 2  •  losartan (COZAAR) 50 MG tablet, Take 50 mg by mouth Daily., Disp: , Rfl: 0  •  montelukast (SINGULAIR) 10 MG tablet, Every Night., Disp: , Rfl:   •  rosuvastatin (CRESTOR) 10 MG tablet, 10 mg Every Night., Disp: , Rfl:   No Known Allergies     Objective   Vitals:    19 1007   BP: 142/82   Pulse: 58   SpO2: 99%     Physical Exam   Constitutional: He is oriented to person, place, and time. He appears well-developed and well-nourished.   HENT:   Head: Normocephalic and atraumatic.   Nose: Nose normal.   Mouth/Throat: Oropharynx is clear and moist.   Eyes: Conjunctivae and EOM are normal. Pupils are equal, round, and reactive to light.   Neck: Normal range of motion. Neck supple.   Cardiovascular: Normal rate, regular rhythm, normal heart sounds and intact distal pulses.   Pulmonary/Chest: Effort normal and breath sounds normal.   Abdominal: Soft. Bowel sounds are normal.   Musculoskeletal: Normal range of motion.   Neurological: He is alert and oriented to person, place, and time.   Skin: Skin is warm and dry. Capillary refill takes less than 2 seconds.   Psychiatric: He has a normal mood and affect. His behavior is normal. Judgment and thought content normal.   Nursing note and vitals reviewed.    Independent Review of Radiographic Studies:     I have independently reviewed the CT chest performed on 9/18/2019 which demonstrates resolution of the right upper lobe opacity that was identified in June 2019.  There is only a very small ill-defined density at the lower portion of this nodule that remains likely represents scar.  There is no mediastinal or hilar lymphadenopathy.  Assessment/Plan   Mr. Martinez is a pleasant 59-year-old gentleman with a significant smoking history who presented in June 2019 with a newly diagnosed lung nodule.  Biopsy of that nodule was consistent with an inflammatory process and he presents with a 3-month CT scan which demonstrates near complete resolution of this nodule.  Given his smoking history and the small amount of residual scarring, I would like to repeat a CT chest in 1 year.  If this does not demonstrate any abnormalities, he will need to continue yearly chest CTs for low-dose screening for lung cancer.    Diagnoses and all orders for this visit:    Lung nodule  -     CT Chest Without Contrast; Future    Dyspnea on exertion

## 2020-09-16 ENCOUNTER — HOSPITAL ENCOUNTER (OUTPATIENT)
Dept: CT IMAGING | Facility: HOSPITAL | Age: 60
Discharge: HOME | End: 2020-09-16
Admitting: THORACIC SURGERY (CARDIOTHORACIC VASCULAR SURGERY)

## 2020-09-16 DIAGNOSIS — R91.1 LUNG NODULE: ICD-10-CM

## 2020-09-16 PROCEDURE — 71250 CT THORAX DX C-: CPT

## 2020-09-24 ENCOUNTER — OFFICE VISIT (OUTPATIENT)
Dept: SURGERY | Facility: CLINIC | Age: 60
End: 2020-09-24

## 2020-09-24 DIAGNOSIS — R91.1 LUNG NODULE: Primary | ICD-10-CM

## 2020-09-24 PROCEDURE — 99441 PR PHYS/QHP TELEPHONE EVALUATION 5-10 MIN: CPT | Performed by: NURSE PRACTITIONER

## 2020-09-24 NOTE — PROGRESS NOTES
Subjective   Patient ID: Corby Martinez is a 60 y.o. male presents today for follow-up. You have chosen to receive care through a telephone visit. Do you consent to use a telephone visit for your medical care today? Yes      History of Present Illness  Dear Colleagues,   Corby Martinez is presents for a telemedicine visit today for continued monitoring and surveillance of a right upper lobe pulmonary nodule which was first identified on a low-dose CT screening of the chest in June 2019.  This lesion had been decreasing in size and the patient presents today to discuss his most recent CT scan.  He reports he has been doing well since he was last seen.  He denies any complaints of fever, chills, cough, hemoptysis, pleuritic chest pain, shortness of air, dyspnea with exertion, night sweats, hoarseness, or unintentional weight loss.     The following portions of the patient's history were reviewed and updated as appropriate: allergies, current medications, past family history, past medical history, past social history, past surgical history and problem list.    Review of Systems   Constitution: Negative for chills, decreased appetite, fever, malaise/fatigue, night sweats and weight loss.   HENT: Negative for congestion and hoarse voice.    Cardiovascular: Negative for chest pain, irregular heartbeat and leg swelling.   Respiratory: Negative for cough, hemoptysis and shortness of breath.    Musculoskeletal: Negative for back pain and falls.   Gastrointestinal: Negative for diarrhea, heartburn, nausea and vomiting.   Neurological: Negative for dizziness, numbness and paresthesias.        Objective   Physical Exam   No vital signs assessed or physical examination performed secondary to telemedicine visit today.      Assessment/Plan   Independent Review of Radiographic Studies: I personally reviewed the CT of the chest performed on 9/16/2020.  There is complete resolution of the nodular lesion in the right upper lobe  anterior medial margin.  No residual abnormality or new lesions are identified.  No mediastinal, hilar or axillary adenopathy.  Small calcified right hilar and mediastinal lymph nodes are present.  No pericardial effusion.  CT imaging through the upper abdomen is unremarkable.  No suspicious bone lesions.    Assessment: Mr. Martinez has been doing well since he was last seen.  A CT of the chest demonstrates complete resolution of the nodular lesion in his right upper lobe.    Plan: I recommended continued surveillance with annual low-dose CT screening given his smoking history.  The patient is in agreement with this and reports he will return to his primary care provider for annual screenings.  Thank you for allowing us to participate in the care of Corby Martinez.  We are happy to see him at anytime in the future, should the need arise.    Diagnoses and all orders for this visit:    Lung nodule    I spent approximately 10 minutes reviewing this patient's chart, radiographic imaging and discussing the results with him by phone today.

## 2021-03-01 RX ORDER — LOSARTAN POTASSIUM 50 MG/1
TABLET ORAL
Qty: 90 TABLET | Refills: 0 | Status: SHIPPED | OUTPATIENT
Start: 2021-03-01 | End: 2021-06-07

## 2021-03-31 RX ORDER — MONTELUKAST SODIUM 10 MG/1
TABLET ORAL
Qty: 30 TABLET | Refills: 0 | Status: SHIPPED | OUTPATIENT
Start: 2021-03-31 | End: 2021-05-07

## 2021-05-07 RX ORDER — MONTELUKAST SODIUM 10 MG/1
TABLET ORAL
Qty: 30 TABLET | Refills: 0 | Status: SHIPPED | OUTPATIENT
Start: 2021-05-07 | End: 2021-06-07

## 2021-05-07 NOTE — TELEPHONE ENCOUNTER
montelukast (SINGULAIR) 10 MG tablet     Sig: Take 1 tablet by mouth once daily    Disp:  30 tablet    Refills:  0     Requesting refill on above medication   [facility]

## 2021-06-07 RX ORDER — MONTELUKAST SODIUM 10 MG/1
TABLET ORAL
Qty: 30 TABLET | Refills: 0 | Status: SHIPPED | OUTPATIENT
Start: 2021-06-07 | End: 2021-07-07

## 2021-06-07 RX ORDER — LOSARTAN POTASSIUM 50 MG/1
TABLET ORAL
Qty: 90 TABLET | Refills: 0 | Status: SHIPPED | OUTPATIENT
Start: 2021-06-07 | End: 2021-09-07

## 2021-06-07 NOTE — TELEPHONE ENCOUNTER
losartan (COZAAR) 50 MG tablet     Sig: Take 1 tablet by mouth once daily    Disp:  90 tablet    Refills:  0                             Name from pharmacy: Montelukast Sodium 10 MG Oral Tablet         Will file in chart as: montelukast (SINGULAIR) 10 MG tablet    Sig: Take 1 tablet by mouth once daily    Disp:  30 tablet    Refills:  0     Rx refill   [facility]

## 2021-07-07 RX ORDER — MONTELUKAST SODIUM 10 MG/1
TABLET ORAL
Qty: 30 TABLET | Refills: 0 | Status: SHIPPED | OUTPATIENT
Start: 2021-07-07 | End: 2021-08-11

## 2021-07-07 NOTE — TELEPHONE ENCOUNTER
Rx refill   montelukast (SINGULAIR) 10 MG tablet     Sig: Take 1 tablet by mouth once daily    Disp:  30 tablet    Refills:  0     [facility]

## 2021-08-11 RX ORDER — MONTELUKAST SODIUM 10 MG/1
TABLET ORAL
Qty: 30 TABLET | Refills: 0 | Status: SHIPPED | OUTPATIENT
Start: 2021-08-11 | End: 2021-09-07

## 2021-08-11 RX ORDER — HYDROCHLOROTHIAZIDE 25 MG/1
TABLET ORAL
Qty: 90 TABLET | Refills: 0 | Status: SHIPPED | OUTPATIENT
Start: 2021-08-11 | End: 2021-11-15

## 2021-09-07 RX ORDER — LOSARTAN POTASSIUM 50 MG/1
TABLET ORAL
Qty: 90 TABLET | Refills: 0 | Status: SHIPPED | OUTPATIENT
Start: 2021-09-07 | End: 2021-12-08

## 2021-09-07 RX ORDER — MONTELUKAST SODIUM 10 MG/1
TABLET ORAL
Qty: 30 TABLET | Refills: 0 | Status: SHIPPED | OUTPATIENT
Start: 2021-09-07 | End: 2021-10-13

## 2021-09-07 NOTE — TELEPHONE ENCOUNTER
Rx Refill Note  Requested Prescriptions     Pending Prescriptions Disp Refills   • montelukast (SINGULAIR) 10 MG tablet [Pharmacy Med Name: Montelukast Sodium 10 MG Oral Tablet] 30 tablet 0     Sig: Take 1 tablet by mouth once daily   • losartan (COZAAR) 50 MG tablet [Pharmacy Med Name: Losartan Potassium 50 MG Oral Tablet] 90 tablet 0     Sig: Take 1 tablet by mouth once daily      Last office visit with prescribing clinician: Visit date not found      Next office visit with prescribing clinician: Visit date not found            Marah Zelaya MA  09/07/21, 07:52 EDT

## 2021-10-13 RX ORDER — MONTELUKAST SODIUM 10 MG/1
TABLET ORAL
Qty: 30 TABLET | Refills: 0 | Status: SHIPPED | OUTPATIENT
Start: 2021-10-13 | End: 2021-11-15

## 2021-10-13 NOTE — TELEPHONE ENCOUNTER
Rx Refill Note  Requested Prescriptions     Pending Prescriptions Disp Refills   • montelukast (SINGULAIR) 10 MG tablet [Pharmacy Med Name: Montelukast Sodium 10 MG Oral Tablet] 30 tablet 0     Sig: Take 1 tablet by mouth once daily      Last office visit with prescribing clinician: Visit date not found      Next office visit with prescribing clinician: Visit date not found            Marah Zelaya MA  10/13/21, 07:33 EDT

## 2021-11-15 RX ORDER — MONTELUKAST SODIUM 10 MG/1
TABLET ORAL
Qty: 30 TABLET | Refills: 0 | Status: SHIPPED | OUTPATIENT
Start: 2021-11-15 | End: 2021-12-08

## 2021-11-15 RX ORDER — HYDROCHLOROTHIAZIDE 25 MG/1
TABLET ORAL
Qty: 90 TABLET | Refills: 0 | Status: SHIPPED | OUTPATIENT
Start: 2021-11-15 | End: 2022-02-22

## 2021-11-15 NOTE — TELEPHONE ENCOUNTER
Rx Refill Note  Requested Prescriptions     Pending Prescriptions Disp Refills   • hydroCHLOROthiazide (HYDRODIURIL) 25 MG tablet [Pharmacy Med Name: hydroCHLOROthiazide 25 MG Oral Tablet] 90 tablet 0     Sig: TAKE 1 TABLET BY MOUTH ONCE DAILY IN THE MORNING   • montelukast (SINGULAIR) 10 MG tablet [Pharmacy Med Name: Montelukast Sodium 10 MG Oral Tablet] 30 tablet 0     Sig: Take 1 tablet by mouth once daily      Last office visit with prescribing clinician: Visit date not found      Next office visit with prescribing clinician: Visit date not found            Glenda Rios MA  11/15/21, 08:11 EST

## 2021-12-08 RX ORDER — MONTELUKAST SODIUM 10 MG/1
TABLET ORAL
Qty: 30 TABLET | Refills: 0 | Status: SHIPPED | OUTPATIENT
Start: 2021-12-08 | End: 2022-01-16

## 2021-12-08 RX ORDER — LOSARTAN POTASSIUM 50 MG/1
TABLET ORAL
Qty: 90 TABLET | Refills: 0 | Status: SHIPPED | OUTPATIENT
Start: 2021-12-08 | End: 2022-03-14

## 2021-12-08 NOTE — TELEPHONE ENCOUNTER
Rx Refill Note  Requested Prescriptions     Pending Prescriptions Disp Refills   • losartan (COZAAR) 50 MG tablet [Pharmacy Med Name: Losartan Potassium 50 MG Oral Tablet] 90 tablet 0     Sig: Take 1 tablet by mouth once daily   • montelukast (SINGULAIR) 10 MG tablet [Pharmacy Med Name: Montelukast Sodium 10 MG Oral Tablet] 30 tablet 0     Sig: Take 1 tablet by mouth once daily      Last office visit with prescribing clinician: Visit date not found      Next office visit with prescribing clinician: Visit date not found            Constantino Dickens MA  12/08/21, 12:43 EST

## 2022-01-16 RX ORDER — MONTELUKAST SODIUM 10 MG/1
TABLET ORAL
Qty: 30 TABLET | Refills: 0 | Status: SHIPPED | OUTPATIENT
Start: 2022-01-16 | End: 2022-02-22

## 2022-02-21 NOTE — TELEPHONE ENCOUNTER
Rx Refill Note  Requested Prescriptions     Pending Prescriptions Disp Refills   • hydroCHLOROthiazide (HYDRODIURIL) 25 MG tablet [Pharmacy Med Name: hydroCHLOROthiazide 25 MG Oral Tablet] 90 tablet 0     Sig: TAKE 1 TABLET BY MOUTH ONCE DAILY IN THE MORNING      Last office visit with prescribing clinician: Visit date not found      Next office visit with prescribing clinician: Visit date not found            Constantino Dickens MA  02/21/22, 08:08 EST

## 2022-02-22 RX ORDER — MONTELUKAST SODIUM 10 MG/1
TABLET ORAL
Qty: 30 TABLET | Refills: 0 | Status: SHIPPED | OUTPATIENT
Start: 2022-02-22 | End: 2022-03-30

## 2022-02-22 RX ORDER — HYDROCHLOROTHIAZIDE 25 MG/1
TABLET ORAL
Qty: 90 TABLET | Refills: 0 | Status: SHIPPED | OUTPATIENT
Start: 2022-02-22 | End: 2022-05-25

## 2022-03-14 RX ORDER — LOSARTAN POTASSIUM 50 MG/1
TABLET ORAL
Qty: 90 TABLET | Refills: 0 | Status: SHIPPED | OUTPATIENT
Start: 2022-03-14 | End: 2022-06-22

## 2022-03-29 NOTE — TELEPHONE ENCOUNTER
Rx Refill Note  Requested Prescriptions     Pending Prescriptions Disp Refills   • montelukast (SINGULAIR) 10 MG tablet [Pharmacy Med Name: Montelukast Sodium 10 MG Oral Tablet] 30 tablet 0     Sig: Take 1 tablet by mouth once daily      Last office visit with prescribing clinician: Visit date not found      Next office visit with prescribing clinician: Visit date not found            Marah Zelaya MA  03/29/22, 07:53 EDT

## 2022-03-30 RX ORDER — MONTELUKAST SODIUM 10 MG/1
TABLET ORAL
Qty: 30 TABLET | Refills: 0 | Status: SHIPPED | OUTPATIENT
Start: 2022-03-30 | End: 2022-05-04

## 2022-05-03 NOTE — TELEPHONE ENCOUNTER
Rx Refill Note  Requested Prescriptions     Pending Prescriptions Disp Refills   • montelukast (SINGULAIR) 10 MG tablet [Pharmacy Med Name: Montelukast Sodium 10 MG Oral Tablet] 30 tablet 0     Sig: Take 1 tablet by mouth once daily      Last office visit with prescribing clinician: Visit date not found      Next office visit with prescribing clinician: Visit date not found            Marah Zleaya MA  05/03/22, 08:00 EDT

## 2022-05-04 RX ORDER — MONTELUKAST SODIUM 10 MG/1
TABLET ORAL
Qty: 30 TABLET | Refills: 0 | Status: SHIPPED | OUTPATIENT
Start: 2022-05-04 | End: 2022-06-01

## 2022-05-25 RX ORDER — HYDROCHLOROTHIAZIDE 25 MG/1
TABLET ORAL
Qty: 90 TABLET | Refills: 0 | Status: SHIPPED | OUTPATIENT
Start: 2022-05-25 | End: 2022-08-30

## 2022-06-01 RX ORDER — MONTELUKAST SODIUM 10 MG/1
TABLET ORAL
Qty: 30 TABLET | Refills: 0 | Status: SHIPPED | OUTPATIENT
Start: 2022-06-01 | End: 2022-07-06

## 2022-06-01 NOTE — TELEPHONE ENCOUNTER
Rx Refill Note  Requested Prescriptions     Pending Prescriptions Disp Refills   • montelukast (SINGULAIR) 10 MG tablet [Pharmacy Med Name: Montelukast Sodium 10 MG Oral Tablet] 30 tablet 0     Sig: Take 1 tablet by mouth once daily      Last office visit with prescribing clinician: Visit date not found      Next office visit with prescribing clinician: Visit date not found            Constantino Dickens MA  06/01/22, 07:53 EDT

## 2022-06-22 RX ORDER — LOSARTAN POTASSIUM 50 MG/1
TABLET ORAL
Qty: 90 TABLET | Refills: 0 | Status: SHIPPED | OUTPATIENT
Start: 2022-06-22 | End: 2022-09-13

## 2022-06-22 NOTE — TELEPHONE ENCOUNTER
Rx Refill Note  Requested Prescriptions     Pending Prescriptions Disp Refills   • losartan (COZAAR) 50 MG tablet [Pharmacy Med Name: Losartan Potassium 50 MG Oral Tablet] 90 tablet 0     Sig: Take 1 tablet by mouth once daily      Last office visit with prescribing clinician: Visit date not found      Next office visit with prescribing clinician: Visit date not found            Constantino Dickens MA  06/22/22, 08:44 EDT

## 2022-07-06 RX ORDER — MONTELUKAST SODIUM 10 MG/1
TABLET ORAL
Qty: 30 TABLET | Refills: 0 | Status: SHIPPED | OUTPATIENT
Start: 2022-07-06 | End: 2022-08-03

## 2022-07-06 NOTE — TELEPHONE ENCOUNTER
Rx Refill Note  Requested Prescriptions     Pending Prescriptions Disp Refills   • montelukast (SINGULAIR) 10 MG tablet [Pharmacy Med Name: Montelukast Sodium 10 MG Oral Tablet] 30 tablet 0     Sig: Take 1 tablet by mouth once daily      Last office visit with prescribing clinician: Visit date not found      Next office visit with prescribing clinician: Visit date not found            Constantino Dickens MA  07/06/22, 07:40 EDT

## 2022-08-03 RX ORDER — MONTELUKAST SODIUM 10 MG/1
TABLET ORAL
Qty: 30 TABLET | Refills: 0 | Status: SHIPPED | OUTPATIENT
Start: 2022-08-03 | End: 2022-08-30

## 2022-08-30 RX ORDER — MONTELUKAST SODIUM 10 MG/1
TABLET ORAL
Qty: 30 TABLET | Refills: 0 | Status: SHIPPED | OUTPATIENT
Start: 2022-08-30 | End: 2022-10-03

## 2022-08-30 RX ORDER — HYDROCHLOROTHIAZIDE 25 MG/1
TABLET ORAL
Qty: 90 TABLET | Refills: 0 | Status: SHIPPED | OUTPATIENT
Start: 2022-08-30 | End: 2022-11-28

## 2022-09-13 RX ORDER — LOSARTAN POTASSIUM 50 MG/1
TABLET ORAL
Qty: 90 TABLET | Refills: 0 | Status: SHIPPED | OUTPATIENT
Start: 2022-09-13 | End: 2022-12-27

## 2022-09-13 NOTE — TELEPHONE ENCOUNTER
Rx Refill Note  Requested Prescriptions     Pending Prescriptions Disp Refills   • losartan (COZAAR) 50 MG tablet [Pharmacy Med Name: Losartan Potassium 50 MG Oral Tablet] 90 tablet 0     Sig: Take 1 tablet by mouth once daily      Last office visit with prescribing clinician: Visit date not found      Next office visit with prescribing clinician: Visit date not found            Constantino Dickens MA  09/13/22, 08:01 EDT

## 2022-10-03 RX ORDER — MONTELUKAST SODIUM 10 MG/1
TABLET ORAL
Qty: 30 TABLET | Refills: 0 | Status: SHIPPED | OUTPATIENT
Start: 2022-10-03 | End: 2022-11-08

## 2022-10-03 NOTE — TELEPHONE ENCOUNTER
Rx Refill Note  Requested Prescriptions     Pending Prescriptions Disp Refills   • montelukast (SINGULAIR) 10 MG tablet [Pharmacy Med Name: Montelukast Sodium 10 MG Oral Tablet] 30 tablet 0     Sig: Take 1 tablet by mouth once daily      Last office visit with prescribing clinician: Visit date not found      Next office visit with prescribing clinician: Visit date not found            Constantino Dickens MA  10/03/22, 08:47 EDT

## 2022-11-08 RX ORDER — MONTELUKAST SODIUM 10 MG/1
TABLET ORAL
Qty: 30 TABLET | Refills: 0 | Status: SHIPPED | OUTPATIENT
Start: 2022-11-08 | End: 2022-12-05

## 2022-11-28 RX ORDER — HYDROCHLOROTHIAZIDE 25 MG/1
TABLET ORAL
Qty: 90 TABLET | Refills: 0 | Status: SHIPPED | OUTPATIENT
Start: 2022-11-28 | End: 2023-03-05

## 2022-11-28 NOTE — TELEPHONE ENCOUNTER
Rx Refill Note  Requested Prescriptions     Pending Prescriptions Disp Refills   • hydroCHLOROthiazide (HYDRODIURIL) 25 MG tablet [Pharmacy Med Name: hydroCHLOROthiazide 25 MG Oral Tablet] 90 tablet 0     Sig: TAKE 1 TABLET BY MOUTH ONCE DAILY IN THE MORNING      Last office visit with prescribing clinician: Visit date not found      Next office visit with prescribing clinician: Visit date not found            Constantino Dickens MA  11/28/22, 10:42 EST

## 2022-12-05 RX ORDER — MONTELUKAST SODIUM 10 MG/1
TABLET ORAL
Qty: 30 TABLET | Refills: 0 | Status: SHIPPED | OUTPATIENT
Start: 2022-12-05

## 2022-12-05 NOTE — TELEPHONE ENCOUNTER
Rx Refill Note  Requested Prescriptions     Pending Prescriptions Disp Refills   • montelukast (SINGULAIR) 10 MG tablet [Pharmacy Med Name: Montelukast Sodium 10 MG Oral Tablet] 30 tablet 0     Sig: Take 1 tablet by mouth once daily      Last office visit with prescribing clinician: Visit date not found   Last telemedicine visit with prescribing clinician: Visit date not found   Next office visit with prescribing clinician: Visit date not found                         Would you like a call back once the refill request has been completed: [] Yes [] No    If the office needs to give you a call back, can they leave a voicemail: [] Yes [] No    Constantino Dickens MA  12/05/22, 08:35 EST

## 2022-12-27 RX ORDER — LOSARTAN POTASSIUM 50 MG/1
TABLET ORAL
Qty: 90 TABLET | Refills: 0 | Status: SHIPPED | OUTPATIENT
Start: 2022-12-27

## 2022-12-27 NOTE — TELEPHONE ENCOUNTER
Rx Refill Note  Requested Prescriptions     Pending Prescriptions Disp Refills   • losartan (COZAAR) 50 MG tablet [Pharmacy Med Name: Losartan Potassium 50 MG Oral Tablet] 90 tablet 0     Sig: Take 1 tablet by mouth once daily      Last office visit with prescribing clinician: Visit date not found   Last telemedicine visit with prescribing clinician: Visit date not found   Next office visit with prescribing clinician: Visit date not found                         Would you like a call back once the refill request has been completed: [] Yes [] No    If the office needs to give you a call back, can they leave a voicemail: [] Yes [] No    Constantino Dickens MA  12/27/22, 09:42 EST

## 2023-03-02 NOTE — TELEPHONE ENCOUNTER
Rx Refill Note  Requested Prescriptions     Pending Prescriptions Disp Refills   • hydroCHLOROthiazide (HYDRODIURIL) 25 MG tablet [Pharmacy Med Name: hydroCHLOROthiazide 25 MG Oral Tablet] 90 tablet 0     Sig: TAKE 1 TABLET BY MOUTH ONCE DAILY IN THE MORNING      Last office visit with prescribing clinician: Visit date not found   Last telemedicine visit with prescribing clinician: Visit date not found   Next office visit with prescribing clinician: Visit date not found                         Would you like a call back once the refill request has been completed: [] Yes [] No    If the office needs to give you a call back, can they leave a voicemail: [] Yes [] No    Constantino Dickens MA  03/02/23, 13:41 EST

## 2023-03-05 RX ORDER — HYDROCHLOROTHIAZIDE 25 MG/1
TABLET ORAL
Qty: 90 TABLET | Refills: 0 | Status: SHIPPED | OUTPATIENT
Start: 2023-03-05

## 2023-04-05 RX ORDER — LOSARTAN POTASSIUM 50 MG/1
TABLET ORAL
Qty: 90 TABLET | Refills: 0 | OUTPATIENT
Start: 2023-04-05

## 2023-04-13 ENCOUNTER — OFFICE VISIT (OUTPATIENT)
Dept: INTERNAL MEDICINE | Facility: CLINIC | Age: 63
End: 2023-04-13
Payer: COMMERCIAL

## 2023-04-13 VITALS
HEIGHT: 68 IN | SYSTOLIC BLOOD PRESSURE: 164 MMHG | BODY MASS INDEX: 26.07 KG/M2 | DIASTOLIC BLOOD PRESSURE: 92 MMHG | HEART RATE: 72 BPM | WEIGHT: 172 LBS | RESPIRATION RATE: 18 BRPM | OXYGEN SATURATION: 94 % | TEMPERATURE: 97.8 F

## 2023-04-13 DIAGNOSIS — I10 PRIMARY HYPERTENSION: ICD-10-CM

## 2023-04-13 DIAGNOSIS — Z12.11 ENCOUNTER FOR SCREENING FOR MALIGNANT NEOPLASM OF COLON: ICD-10-CM

## 2023-04-13 DIAGNOSIS — Z12.5 PROSTATE CANCER SCREENING: ICD-10-CM

## 2023-04-13 DIAGNOSIS — Z11.59 ENCOUNTER FOR HCV SCREENING TEST FOR LOW RISK PATIENT: ICD-10-CM

## 2023-04-13 DIAGNOSIS — Z00.00 WELL ADULT EXAM: ICD-10-CM

## 2023-04-13 DIAGNOSIS — Z71.89 ENCOUNTER FOR HIV SCREENING AND DISCUSSION OF PRE-EXPOSURE PROPHYLAXIS FOR HIV: ICD-10-CM

## 2023-04-13 DIAGNOSIS — Z78.9 ALCOHOL CONSUMPTION OF MORE THAN TWO DRINKS PER DAY: ICD-10-CM

## 2023-04-13 DIAGNOSIS — Z11.4 ENCOUNTER FOR HIV SCREENING AND DISCUSSION OF PRE-EXPOSURE PROPHYLAXIS FOR HIV: ICD-10-CM

## 2023-04-13 DIAGNOSIS — Z72.0 TOBACCO ABUSE: Primary | ICD-10-CM

## 2023-04-13 LAB
BILIRUB BLD-MCNC: NEGATIVE MG/DL
CLARITY, POC: CLEAR
COLOR UR: YELLOW
EXPIRATION DATE: ABNORMAL
GLUCOSE UR STRIP-MCNC: NEGATIVE MG/DL
KETONES UR QL: NEGATIVE
LEUKOCYTE EST, POC: NEGATIVE
Lab: ABNORMAL
NITRITE UR-MCNC: NEGATIVE MG/ML
PH UR: 6.5 [PH] (ref 5–8)
PROT UR STRIP-MCNC: NEGATIVE MG/DL
RBC # UR STRIP: ABNORMAL /UL
SP GR UR: 1.01 (ref 1–1.03)
UROBILINOGEN UR QL: NORMAL

## 2023-04-13 RX ORDER — HYDROCHLOROTHIAZIDE 25 MG/1
25 TABLET ORAL EVERY MORNING
Qty: 90 TABLET | Refills: 2 | Status: SHIPPED | OUTPATIENT
Start: 2023-04-13 | End: 2023-04-17

## 2023-04-13 RX ORDER — LOSARTAN POTASSIUM 50 MG/1
50 TABLET ORAL DAILY
Qty: 90 TABLET | Refills: 2 | Status: SHIPPED | OUTPATIENT
Start: 2023-04-13

## 2023-04-13 RX ORDER — ROSUVASTATIN CALCIUM 10 MG/1
10 TABLET, COATED ORAL NIGHTLY
Qty: 90 TABLET | Refills: 2 | Status: SHIPPED | OUTPATIENT
Start: 2023-04-13

## 2023-04-13 NOTE — PROGRESS NOTES
"Chief Complaint   Patient presents with   • Hypertension     Follow up        Subjective   Corby Martinez is a 63 y.o. male.     History of Present Illness     Out of BP meds for about 1wk. Nop headaches, chest pain, no cough.     64 y/o M w/ sig smoking hx. Had presented in 2019 to thoracic surg with new lung nodule. Biopsy results showed inflammatory process, 3mo f/u showed resolution. He was supposed to get CT scan f/u 1 year later that wasn't obtained. Thoracic surgery recommended yearly low dose CT scans regardless for lung cancer screening.     Smoking hx: quit 10 years ago, 30-40 pack year hx    Review of Systems   Constitutional: Negative for activity change, chills and fever.   HENT: Negative for congestion and sore throat.    Respiratory: Negative for cough and shortness of breath.    Cardiovascular: Negative for chest pain and palpitations.   Gastrointestinal: Negative for abdominal distention, abdominal pain, constipation and diarrhea.   Genitourinary: Negative for dysuria and urgency.   Musculoskeletal: Negative for back pain and joint swelling.   Skin: Negative for color change and skin lesions.   Neurological: Negative for dizziness, light-headedness and headache.   Psychiatric/Behavioral: Negative for stress. The patient is not nervous/anxious.          Objective   Body mass index is 26.15 kg/m².   Vitals:    04/13/23 1517   BP: 164/92   Pulse: 72   Resp: 18   Temp: 97.8 °F (36.6 °C)   SpO2: 94%   Weight: 78 kg (172 lb)   Height: 172.7 cm (68\")        Physical Exam  Constitutional:       General: He is not in acute distress.     Appearance: Normal appearance.   HENT:      Head: Normocephalic and atraumatic.      Right Ear: Tympanic membrane, ear canal and external ear normal.      Left Ear: Tympanic membrane, ear canal and external ear normal.      Nose: Nose normal. No congestion.      Mouth/Throat:      Mouth: Mucous membranes are moist.      Pharynx: Oropharynx is clear.   Eyes:      Extraocular " Movements: Extraocular movements intact.      Conjunctiva/sclera: Conjunctivae normal.      Pupils: Pupils are equal, round, and reactive to light.   Cardiovascular:      Rate and Rhythm: Normal rate and regular rhythm.   Pulmonary:      Effort: Pulmonary effort is normal. No respiratory distress.      Breath sounds: Normal breath sounds.   Abdominal:      General: Abdomen is flat.      Palpations: Abdomen is soft.      Tenderness: There is no abdominal tenderness.   Musculoskeletal:         General: No swelling or tenderness. Normal range of motion.      Cervical back: Normal range of motion and neck supple.   Skin:     General: Skin is warm and dry.      Capillary Refill: Capillary refill takes less than 2 seconds.   Neurological:      General: No focal deficit present.      Mental Status: He is alert and oriented to person, place, and time. Mental status is at baseline.      Cranial Nerves: No cranial nerve deficit.   Psychiatric:         Mood and Affect: Mood normal.         Behavior: Behavior normal.           Current Outpatient Medications:   •  hydroCHLOROthiazide (HYDRODIURIL) 25 MG tablet, TAKE 1 TABLET BY MOUTH ONCE DAILY IN THE MORNING, Disp: 90 tablet, Rfl: 0  •  losartan (COZAAR) 50 MG tablet, Take 1 tablet by mouth once daily, Disp: 90 tablet, Rfl: 0  •  montelukast (SINGULAIR) 10 MG tablet, Take 1 tablet by mouth once daily, Disp: 30 tablet, Rfl: 0  •  rosuvastatin (CRESTOR) 10 MG tablet, 1 tablet Every Night., Disp: , Rfl:      No results found for: CBCDIF, CMP, LIPIDINTERP, HGBA1C, TSH, [id number], PSA, TESTOSTERONE     Health Maintenance   Topic Date Due   • COLORECTAL CANCER SCREENING  Never done   • COVID-19 Vaccine (1) Never done   • HEPATITIS C SCREENING  Never done   • LIPID PANEL  Never done   • INFLUENZA VACCINE  08/01/2023   • ANNUAL PHYSICAL  04/13/2024   • TDAP/TD VACCINES (3 - Td or Tdap) 12/07/2031   • ZOSTER VACCINE  Completed   • Pneumococcal Vaccine 0-64  Aged Out        There is no  immunization history for the selected administration types on file for this patient.      ECG 12 Lead    Date/Time: 4/17/2023 12:53 PM  Performed by: Matthias Sheikh MD  Authorized by: Matthias Sheikh MD   Comparison: not compared with previous ECG   Previous ECG: no previous ECG available  Rate: normal  Conduction: conduction normal  ST Segments: ST segments normal  T Waves: T waves normal  QRS axis: normal    Clinical impression: normal ECG            Patient appear well but has not seen been seen in awhile, likely before COVID pandemic. Thus, he does classify as a new patient. Has sig smoking hx (30-40pk years). Needs yearly low dose CT scan. Has stopped smoking >10 years ago. He is also due for colonoscopy, will refer, last one over 10 years ago self reported as negative. He is not due for AAA screening or pneumococcal vaccine until 64 years of age. Will check labs as below. BP elevated in office today, patient has been off meds for at least a week. Will restart dual therapy with HCTZ and losartan, will also restart crestor at current dose. He is having some mood issues when it comes to lack of motivation doing things. Will follow up with this at next visit, may be a candidate for SSRI therapy. Also addressed excessive etoh intake, he does not need alcohol for eye opener, nor has his wife felt like he needed intervention. He does not feel guilty about the amount he drinks. Discussed the recommended daily amount with patient, will cont to address this issue as needed.    Assessment & Plan   Diagnoses and all orders for this visit:    1. Tobacco abuse (Primary)  -     CT Chest Low Dose Wo; Future    2. Well adult exam  -     Comprehensive Metabolic Panel  -     Lipid Panel  -     Hemoglobin A1c  -     CBC & Differential    3. Prostate cancer screening  -     PSA Screen    4. Encounter for screening for malignant neoplasm of colon  -     Ambulatory Referral For Screening Colonoscopy    5. Encounter for HIV  screening and discussion of pre-exposure prophylaxis for HIV  -     HIV-1 / O / 2 Ag / Antibody 4th Generation    6. Encounter for HCV screening test for low risk patient  -     Hepatitis C Antibody    7. Alcohol consumption of more than two drinks per day    Other orders  -     Cancel: Pneumococcal Conjugate Vaccine 20-Valent (PCV20)           Well adult exam  - labs checked and evaluated    Colonoscopy - Due, has been over 10 years  Prostate - PSA today  Glaucoma - recently had vision screen this year, normal  AAA - Needs at 65 y/o  Lung cancer - Needs annual low dose CT?  HIV - obtain today  HCV - obtain today  DM - A1c today  HLD - Lipids today  Smoking - Not currently smoking, quit over 10 years ago (before 2012)  Depression - PHQ2-0  Vaccines - had both shingrix, get pneumonia shot at 64  Falls - no issues  Alcohol Screening - has 4-5 beers per day on average. (    Discussed mental health, sexual health, substance use, abuse, anticipatory guidance given.      No follow-ups on file.     Matthias Sheikh MD  [facility]  Internal Medicine and Pediatrics  Phone: [phone]  Fax: [phone]

## 2023-04-14 LAB
ALBUMIN SERPL-MCNC: 4.8 G/DL (ref 3.8–4.8)
ALBUMIN/GLOB SERPL: 2.2 {RATIO} (ref 1.2–2.2)
ALP SERPL-CCNC: 69 IU/L (ref 44–121)
ALT SERPL-CCNC: 19 IU/L (ref 0–44)
AST SERPL-CCNC: 25 IU/L (ref 0–40)
BASOPHILS # BLD AUTO: 0.1 X10E3/UL (ref 0–0.2)
BASOPHILS NFR BLD AUTO: 1 %
BILIRUB SERPL-MCNC: 0.5 MG/DL (ref 0–1.2)
BUN SERPL-MCNC: 11 MG/DL (ref 8–27)
BUN/CREAT SERPL: 13 (ref 10–24)
CALCIUM SERPL-MCNC: 9.9 MG/DL (ref 8.6–10.2)
CHLORIDE SERPL-SCNC: 92 MMOL/L (ref 96–106)
CHOLEST SERPL-MCNC: 241 MG/DL (ref 100–199)
CO2 SERPL-SCNC: 28 MMOL/L (ref 20–29)
CREAT SERPL-MCNC: 0.83 MG/DL (ref 0.76–1.27)
EGFRCR SERPLBLD CKD-EPI 2021: 98 ML/MIN/1.73
EOSINOPHIL # BLD AUTO: 0.2 X10E3/UL (ref 0–0.4)
EOSINOPHIL NFR BLD AUTO: 3 %
ERYTHROCYTE [DISTWIDTH] IN BLOOD BY AUTOMATED COUNT: 12.5 % (ref 11.6–15.4)
GLOBULIN SER CALC-MCNC: 2.2 G/DL (ref 1.5–4.5)
GLUCOSE SERPL-MCNC: 100 MG/DL (ref 70–99)
HBA1C MFR BLD: 5.6 % (ref 4.8–5.6)
HCT VFR BLD AUTO: 41.8 % (ref 37.5–51)
HCV IGG SERPL QL IA: NON REACTIVE
HDLC SERPL-MCNC: 56 MG/DL
HGB BLD-MCNC: 14.9 G/DL (ref 13–17.7)
HIV 1+2 AB+HIV1 P24 AG SERPL QL IA: NON REACTIVE
IMM GRANULOCYTES # BLD AUTO: 0.1 X10E3/UL (ref 0–0.1)
IMM GRANULOCYTES NFR BLD AUTO: 1 %
LDLC SERPL CALC-MCNC: 153 MG/DL (ref 0–99)
LYMPHOCYTES # BLD AUTO: 2.4 X10E3/UL (ref 0.7–3.1)
LYMPHOCYTES NFR BLD AUTO: 26 %
MCH RBC QN AUTO: 31.4 PG (ref 26.6–33)
MCHC RBC AUTO-ENTMCNC: 35.6 G/DL (ref 31.5–35.7)
MCV RBC AUTO: 88 FL (ref 79–97)
MONOCYTES # BLD AUTO: 1 X10E3/UL (ref 0.1–0.9)
MONOCYTES NFR BLD AUTO: 10 %
NEUTROPHILS # BLD AUTO: 5.6 X10E3/UL (ref 1.4–7)
NEUTROPHILS NFR BLD AUTO: 59 %
PLATELET # BLD AUTO: 321 X10E3/UL (ref 150–450)
POTASSIUM SERPL-SCNC: 4.2 MMOL/L (ref 3.5–5.2)
PROT SERPL-MCNC: 7 G/DL (ref 6–8.5)
PSA SERPL-MCNC: 1.4 NG/ML (ref 0–4)
RBC # BLD AUTO: 4.74 X10E6/UL (ref 4.14–5.8)
SODIUM SERPL-SCNC: 133 MMOL/L (ref 134–144)
TRIGL SERPL-MCNC: 177 MG/DL (ref 0–149)
VLDLC SERPL CALC-MCNC: 32 MG/DL (ref 5–40)
WBC # BLD AUTO: 9.4 X10E3/UL (ref 3.4–10.8)

## 2023-04-17 RX ORDER — AMLODIPINE BESYLATE 5 MG/1
5 TABLET ORAL DAILY
Qty: 90 TABLET | Refills: 2 | Status: SHIPPED | OUTPATIENT
Start: 2023-04-17

## 2023-04-24 ENCOUNTER — HOSPITAL ENCOUNTER (OUTPATIENT)
Dept: CT IMAGING | Facility: HOSPITAL | Age: 63
Discharge: HOME | End: 2023-04-24
Admitting: INTERNAL MEDICINE
Payer: COMMERCIAL

## 2023-04-24 DIAGNOSIS — Z72.0 TOBACCO ABUSE: ICD-10-CM

## 2023-04-24 PROCEDURE — 71271 CT THORAX LUNG CANCER SCR C-: CPT

## 2023-05-15 ENCOUNTER — OFFICE VISIT (OUTPATIENT)
Dept: INTERNAL MEDICINE | Facility: CLINIC | Age: 63
End: 2023-05-15
Payer: COMMERCIAL

## 2023-05-15 VITALS
HEIGHT: 68 IN | OXYGEN SATURATION: 95 % | RESPIRATION RATE: 18 BRPM | WEIGHT: 171 LBS | DIASTOLIC BLOOD PRESSURE: 76 MMHG | HEART RATE: 74 BPM | BODY MASS INDEX: 25.91 KG/M2 | SYSTOLIC BLOOD PRESSURE: 144 MMHG | TEMPERATURE: 98 F

## 2023-05-15 DIAGNOSIS — I10 PRIMARY HYPERTENSION: Primary | ICD-10-CM

## 2023-05-15 PROCEDURE — 99214 OFFICE O/P EST MOD 30 MIN: CPT | Performed by: INTERNAL MEDICINE

## 2023-05-16 LAB
BUN SERPL-MCNC: 11 MG/DL (ref 8–23)
BUN/CREAT SERPL: 15.1 (ref 7–25)
CALCIUM SERPL-MCNC: 9.9 MG/DL (ref 8.6–10.5)
CHLORIDE SERPL-SCNC: 97 MMOL/L (ref 98–107)
CO2 SERPL-SCNC: 29 MMOL/L (ref 22–29)
CREAT SERPL-MCNC: 0.73 MG/DL (ref 0.76–1.27)
EGFRCR SERPLBLD CKD-EPI 2021: 101.6 ML/MIN/1.73
GLUCOSE SERPL-MCNC: 94 MG/DL (ref 65–99)
POTASSIUM SERPL-SCNC: 4.7 MMOL/L (ref 3.5–5.2)
SODIUM SERPL-SCNC: 138 MMOL/L (ref 136–145)

## 2023-05-17 NOTE — PROGRESS NOTES
"Chief Complaint  Hypertension (Follow up )    Subjective        Corby Martinez presents to [facility]  History of Present Illness  HLD, HTN, doing well, no chest pain, sob, headaches, vision changes, no lows; keeping home log, normal        Objective   Vital Signs:  /76   Pulse 74   Temp 98 °F (36.7 °C)   Resp 18   Ht 172.7 cm (68\")   Wt 77.6 kg (171 lb)   SpO2 95%   BMI 26.00 kg/m²   Estimated body mass index is 26 kg/m² as calculated from the following:    Height as of this encounter: 172.7 cm (68\").    Weight as of this encounter: 77.6 kg (171 lb).             Physical Exam  Vitals and nursing note reviewed.   Constitutional:       General: He is not in acute distress.     Appearance: Normal appearance.   HENT:      Head: Normocephalic and atraumatic.      Right Ear: External ear normal.      Left Ear: External ear normal.      Nose: Nose normal.      Mouth/Throat:      Mouth: Mucous membranes are moist.      Pharynx: Oropharynx is clear.   Eyes:      Extraocular Movements: Extraocular movements intact.      Conjunctiva/sclera: Conjunctivae normal.      Pupils: Pupils are equal, round, and reactive to light.   Cardiovascular:      Rate and Rhythm: Normal rate and regular rhythm.      Pulses: Normal pulses.      Heart sounds: Normal heart sounds. No murmur heard.    No gallop.   Pulmonary:      Effort: Pulmonary effort is normal.      Breath sounds: Normal breath sounds.   Abdominal:      General: Abdomen is flat. Bowel sounds are normal. There is no distension.      Palpations: Abdomen is soft. There is no mass.      Tenderness: There is no abdominal tenderness.   Musculoskeletal:         General: No swelling. Normal range of motion.      Cervical back: Normal range of motion and neck supple.   Skin:     General: Skin is warm and dry.      Findings: No rash.   Neurological:      General: No focal deficit present.      Mental Status: He is alert and " oriented to person, place, and time. Mental status is at baseline.   Psychiatric:         Mood and Affect: Mood normal.         Behavior: Behavior normal.        Result Review :                   Assessment and Plan   Diagnoses and all orders for this visit:    1. Primary hypertension (Primary)  -     Basic metabolic panel     - check labs as above  - keep home log, BP much better today, goal <140/90  - continue amlodipine, losartan, rosuvastation  - rtc 2-3 months follow up         Follow Up   No follow-ups on file.  Patient was given instructions and counseling regarding his condition or for health maintenance advice. Please see specific information pulled into the AVS if appropriate.

## 2023-12-04 ENCOUNTER — HOSPITAL ENCOUNTER (EMERGENCY)
Facility: HOSPITAL | Age: 63
Discharge: HOME | End: 2023-12-04
Attending: EMERGENCY MEDICINE | Admitting: EMERGENCY MEDICINE
Payer: COMMERCIAL

## 2023-12-04 ENCOUNTER — APPOINTMENT (OUTPATIENT)
Dept: GENERAL RADIOLOGY | Facility: HOSPITAL | Age: 63
End: 2023-12-04
Payer: COMMERCIAL

## 2023-12-04 VITALS
DIASTOLIC BLOOD PRESSURE: 92 MMHG | RESPIRATION RATE: 18 BRPM | BODY MASS INDEX: 25.58 KG/M2 | SYSTOLIC BLOOD PRESSURE: 137 MMHG | WEIGHT: 163 LBS | HEART RATE: 62 BPM | TEMPERATURE: 98 F | OXYGEN SATURATION: 97 % | HEIGHT: 67 IN

## 2023-12-04 DIAGNOSIS — R07.9 CHEST PAIN, UNSPECIFIED TYPE: Primary | ICD-10-CM

## 2023-12-04 LAB
ALBUMIN SERPL-MCNC: 4.8 G/DL (ref 3.5–5.2)
ALBUMIN/GLOB SERPL: 1.8 G/DL
ALP SERPL-CCNC: 63 U/L (ref 39–117)
ALT SERPL W P-5'-P-CCNC: 15 U/L (ref 1–41)
ANION GAP SERPL CALCULATED.3IONS-SCNC: 13.2 MMOL/L (ref 5–15)
AST SERPL-CCNC: 19 U/L (ref 1–40)
BASOPHILS # BLD AUTO: 0.05 10*3/MM3 (ref 0–0.2)
BASOPHILS NFR BLD AUTO: 0.5 % (ref 0–1.5)
BILIRUB SERPL-MCNC: 0.7 MG/DL (ref 0–1.2)
BUN SERPL-MCNC: 9 MG/DL (ref 8–23)
BUN/CREAT SERPL: 11.4 (ref 7–25)
CALCIUM SPEC-SCNC: 10.1 MG/DL (ref 8.6–10.5)
CHLORIDE SERPL-SCNC: 95 MMOL/L (ref 98–107)
CO2 SERPL-SCNC: 26.8 MMOL/L (ref 22–29)
CREAT SERPL-MCNC: 0.79 MG/DL (ref 0.76–1.27)
DEPRECATED RDW RBC AUTO: 44 FL (ref 37–54)
EGFRCR SERPLBLD CKD-EPI 2021: 99.2 ML/MIN/1.73
EOSINOPHIL # BLD AUTO: 0.08 10*3/MM3 (ref 0–0.4)
EOSINOPHIL NFR BLD AUTO: 0.8 % (ref 0.3–6.2)
ERYTHROCYTE [DISTWIDTH] IN BLOOD BY AUTOMATED COUNT: 12.9 % (ref 12.3–15.4)
GEN 5 2HR TROPONIN T REFLEX: <6 NG/L
GLOBULIN UR ELPH-MCNC: 2.7 GM/DL
GLUCOSE SERPL-MCNC: 105 MG/DL (ref 65–99)
HCT VFR BLD AUTO: 44.4 % (ref 37.5–51)
HGB BLD-MCNC: 14.5 G/DL (ref 13–17.7)
HOLD SPECIMEN: NORMAL
HOLD SPECIMEN: NORMAL
IMM GRANULOCYTES # BLD AUTO: 0.04 10*3/MM3 (ref 0–0.05)
IMM GRANULOCYTES NFR BLD AUTO: 0.4 % (ref 0–0.5)
LYMPHOCYTES # BLD AUTO: 2.06 10*3/MM3 (ref 0.7–3.1)
LYMPHOCYTES NFR BLD AUTO: 21 % (ref 19.6–45.3)
MCH RBC QN AUTO: 30 PG (ref 26.6–33)
MCHC RBC AUTO-ENTMCNC: 32.7 G/DL (ref 31.5–35.7)
MCV RBC AUTO: 91.9 FL (ref 79–97)
MONOCYTES # BLD AUTO: 0.74 10*3/MM3 (ref 0.1–0.9)
MONOCYTES NFR BLD AUTO: 7.5 % (ref 5–12)
NEUTROPHILS NFR BLD AUTO: 6.86 10*3/MM3 (ref 1.7–7)
NEUTROPHILS NFR BLD AUTO: 69.8 % (ref 42.7–76)
PLATELET # BLD AUTO: 312 10*3/MM3 (ref 140–450)
PMV BLD AUTO: 8.7 FL (ref 6–12)
POTASSIUM SERPL-SCNC: 4.5 MMOL/L (ref 3.5–5.2)
PROT SERPL-MCNC: 7.5 G/DL (ref 6–8.5)
RBC # BLD AUTO: 4.83 10*6/MM3 (ref 4.14–5.8)
SODIUM SERPL-SCNC: 135 MMOL/L (ref 136–145)
TROPONIN T DELTA: NORMAL
TROPONIN T SERPL HS-MCNC: <6 NG/L
WBC NRBC COR # BLD AUTO: 9.83 10*3/MM3 (ref 3.4–10.8)
WHOLE BLOOD HOLD COAG: NORMAL
WHOLE BLOOD HOLD SPECIMEN: NORMAL

## 2023-12-04 PROCEDURE — 71045 X-RAY EXAM CHEST 1 VIEW: CPT

## 2023-12-04 PROCEDURE — 84484 ASSAY OF TROPONIN QUANT: CPT | Performed by: EMERGENCY MEDICINE

## 2023-12-04 PROCEDURE — 85025 COMPLETE CBC W/AUTO DIFF WBC: CPT | Performed by: EMERGENCY MEDICINE

## 2023-12-04 PROCEDURE — 36415 COLL VENOUS BLD VENIPUNCTURE: CPT

## 2023-12-04 PROCEDURE — 99284 EMERGENCY DEPT VISIT MOD MDM: CPT

## 2023-12-04 PROCEDURE — 80053 COMPREHEN METABOLIC PANEL: CPT | Performed by: EMERGENCY MEDICINE

## 2023-12-04 PROCEDURE — 93005 ELECTROCARDIOGRAM TRACING: CPT | Performed by: EMERGENCY MEDICINE

## 2023-12-04 RX ORDER — SODIUM CHLORIDE 0.9 % (FLUSH) 0.9 %
10 SYRINGE (ML) INJECTION AS NEEDED
Status: DISCONTINUED | OUTPATIENT
Start: 2023-12-04 | End: 2023-12-04 | Stop reason: HOSPADM

## 2023-12-04 RX ORDER — NITROGLYCERIN 0.4 MG/1
0.4 TABLET SUBLINGUAL
Status: DISCONTINUED | OUTPATIENT
Start: 2023-12-04 | End: 2023-12-04 | Stop reason: HOSPADM

## 2023-12-04 RX ORDER — MELOXICAM 15 MG/1
15 TABLET ORAL DAILY
Qty: 7 TABLET | Refills: 0 | Status: SHIPPED | OUTPATIENT
Start: 2023-12-04

## 2023-12-04 RX ORDER — ASPIRIN 81 MG/1
324 TABLET, CHEWABLE ORAL ONCE
Status: COMPLETED | OUTPATIENT
Start: 2023-12-04 | End: 2023-12-04

## 2023-12-04 RX ADMIN — ASPIRIN 81 MG CHEWABLE TABLET 324 MG: 81 TABLET CHEWABLE at 11:27

## 2023-12-04 RX ADMIN — NITROGLYCERIN 0.4 MG: 0.4 TABLET SUBLINGUAL at 11:28

## 2023-12-04 NOTE — ED PROVIDER NOTES
Subjective   History of Present Illness  63-year-old male presents emergency room with chest pain.  Patient states that around 4 PM yesterday while sitting watching TV he had onset of left-sided shoulder pain and chest pain that lasted for minutes to hours and has been intermittent since.  Patient knows of nothing that makes the symptoms better or worse.  He describes the pain as an ache.  Patient is taken no medications for such.  This morning patient started having symptoms again and so decided to come the emergency room to be evaluated.  Patient denies headache visual changes neck pain jaw pain sore throat diaphoresis shortness of breath anorexia nausea vomiting diarrhea.      Review of Systems   Constitutional:  Negative for activity change, appetite change, chills, diaphoresis, fatigue and fever.   HENT:  Negative for congestion, sinus pressure, sneezing and sore throat.    Eyes:  Negative for photophobia and visual disturbance.   Respiratory:  Negative for cough and shortness of breath.    Cardiovascular:  Positive for chest pain. Negative for palpitations and leg swelling.   Gastrointestinal:  Negative for abdominal distention, abdominal pain, diarrhea, nausea and vomiting.   Genitourinary:  Negative for dysuria and flank pain.   Musculoskeletal:  Negative for arthralgias, back pain and myalgias.   Skin:  Negative for rash.   Neurological:  Negative for dizziness, weakness and headaches.   Psychiatric/Behavioral:  Negative for behavioral problems and confusion.        Past Medical History:   Diagnosis Date    Hyperlipidemia     Hypertension     Lung mass     Pneumonia        No Known Allergies    Past Surgical History:   Procedure Laterality Date    HERNIA REPAIR         Family History   Problem Relation Age of Onset    Skin cancer Mother        Social History     Socioeconomic History    Marital status:    Tobacco Use    Smoking status: Former     Packs/day: 1.00     Years: 36.00     Additional pack  years: 0.00     Total pack years: 36.00     Types: Cigarettes     Quit date:      Years since quittin.9    Tobacco comments:     Former smoker quit 8 years ago.  Began smoking at age 15 at 1ppd for 36 years.   Vaping Use    Vaping Use: Never used   Substance and Sexual Activity    Alcohol use: Yes     Alcohol/week: 18.0 standard drinks of alcohol     Types: 18 Cans of beer per week     Comment: Reports 18 beers weekly.    Drug use: No    Sexual activity: Defer           Objective   Physical Exam  Vitals and nursing note reviewed.   Constitutional:       General: He is not in acute distress.     Appearance: Normal appearance. He is not toxic-appearing or diaphoretic.      Comments: 63-year-old male in no acute distress   HENT:      Head: Normocephalic and atraumatic.      Mouth/Throat:      Mouth: Mucous membranes are moist.   Eyes:      Conjunctiva/sclera: Conjunctivae normal.   Cardiovascular:      Rate and Rhythm: Normal rate and regular rhythm.      Pulses: Normal pulses.      Heart sounds: Normal heart sounds.   Pulmonary:      Effort: Pulmonary effort is normal. No respiratory distress.      Breath sounds: Normal breath sounds. No wheezing or rales.   Abdominal:      General: Abdomen is flat. There is no distension.      Tenderness: There is no abdominal tenderness.   Musculoskeletal:         General: No swelling or signs of injury. Normal range of motion.      Cervical back: Normal range of motion and neck supple.      Comments: Patient has no tenderness to palpation over his left shoulder trapezius paraspinal muscles or upper back.  Patient states he is having chest pain now but active and passive range of motion and palpation of area did not elicit his symptoms.   Skin:     General: Skin is warm and dry.      Findings: No rash.   Neurological:      General: No focal deficit present.      Mental Status: He is alert and oriented to person, place, and time.         Procedures           ED Course  ED  Course as of 12/04/23 1418   Mon Dec 04, 2023   1125 EKG time 1035  Normal sinus rhythm  Heart rate 63  Axes are normal  Intervals are normal  No acute ST abnormalities noted []   1306 Chest x-ray shows no acute abnormality []   1307 HS Troponin T: <6 [BH]   1307 HS Troponin T: <6 []   1307 No delta needed []   1416 Patient's lab work EKG and chest x-ray all showed no acute abnormality.  Nitroglycerin did not improve patient's symptoms either.  At this time I do not feel patient's etiology is cardiac in origin.  Will discharge patient home with follow-up primary care physician and/or can return the emergency room for new persistent or worsening symptoms. []   1417 I will treat patient symptomatically with anti-inflammatory medication for possible musculoskeletal involvement and have patient follow with primary care []      ED Course User Index  [] Emir Huber MD                HEART Score: 2                              Medical Decision Making  My differential diagnosis for chest pain includes but is not limited to:  Muscle strain, costochondritis, myositis, pleurisy, rib fracture, intercostal neuritis, herpes zoster, tumor, myocardial infarction, coronary syndrome, unstable angina, angina, aortic dissection, mitral valve prolapse, pericarditis, palpitations, pulmonary embolus, pneumonia, pneumothorax, lung cancer, GERD, esophagitis, esophageal spasm     Amount and/or Complexity of Data Reviewed  Labs: ordered. Decision-making details documented in ED Course.  Radiology: ordered. Decision-making details documented in ED Course.  ECG/medicine tests: ordered. Decision-making details documented in ED Course.    Risk  OTC drugs.  Prescription drug management.        Final diagnoses:   Chest pain, unspecified type       ED Disposition  ED Disposition       ED Disposition   Discharge    Condition   Stable    Comment   --               Matthias Sheikh MD  [address] [facility]  [address]  [facility] [address] [phone]    Schedule an appointment as soon as possible for a visit       [facility] EMERGENCY DEPARTMENT  [address] [place] [address] [phone]  Go to   As needed         Medication List        New Prescriptions      meloxicam 15 MG tablet  Commonly known as: MOBIC  Take 1 tablet by mouth Daily.               Where to Get Your Medications        These medications were sent to [id number] - [place] - [address] - [phone]  - [phone]   [address], [place] [address]      Phone: [phone]   meloxicam 15 MG tablet            Emir Huber MD  12/04/23 2143

## 2023-12-05 LAB
QT INTERVAL: 425 MS
QTC INTERVAL: 434 MS

## 2024-01-25 ENCOUNTER — OFFICE VISIT (OUTPATIENT)
Dept: INTERNAL MEDICINE | Facility: CLINIC | Age: 64
End: 2024-01-25
Payer: COMMERCIAL

## 2024-01-25 VITALS
DIASTOLIC BLOOD PRESSURE: 90 MMHG | WEIGHT: 163 LBS | HEART RATE: 78 BPM | OXYGEN SATURATION: 96 % | HEIGHT: 68 IN | RESPIRATION RATE: 16 BRPM | TEMPERATURE: 97.7 F | SYSTOLIC BLOOD PRESSURE: 142 MMHG | BODY MASS INDEX: 24.71 KG/M2

## 2024-01-25 DIAGNOSIS — I10 PRIMARY HYPERTENSION: Primary | ICD-10-CM

## 2024-01-25 DIAGNOSIS — F41.9 ANXIETY: ICD-10-CM

## 2024-01-25 DIAGNOSIS — E78.2 MIXED HYPERLIPIDEMIA: ICD-10-CM

## 2024-01-25 PROCEDURE — 99214 OFFICE O/P EST MOD 30 MIN: CPT | Performed by: STUDENT IN AN ORGANIZED HEALTH CARE EDUCATION/TRAINING PROGRAM

## 2024-01-25 RX ORDER — ESCITALOPRAM OXALATE 10 MG/1
10 TABLET ORAL DAILY
Qty: 90 TABLET | Refills: 0 | Status: SHIPPED | OUTPATIENT
Start: 2024-01-25

## 2024-01-25 RX ORDER — AMLODIPINE BESYLATE 10 MG/1
10 TABLET ORAL DAILY
Qty: 90 TABLET | Refills: 3 | Status: SHIPPED | OUTPATIENT
Start: 2024-01-25

## 2024-01-25 RX ORDER — ROSUVASTATIN CALCIUM 20 MG/1
20 TABLET, COATED ORAL DAILY
Qty: 90 TABLET | Refills: 3 | Status: SHIPPED | OUTPATIENT
Start: 2024-01-25

## 2024-01-25 RX ORDER — LOSARTAN POTASSIUM 50 MG/1
50 TABLET ORAL DAILY
Qty: 90 TABLET | Refills: 3 | Status: SHIPPED | OUTPATIENT
Start: 2024-01-25

## 2024-01-25 RX ORDER — HYDROCHLOROTHIAZIDE 25 MG/1
25 TABLET ORAL DAILY
Qty: 90 TABLET | Refills: 3 | Status: SHIPPED | OUTPATIENT
Start: 2024-01-25

## 2024-01-25 RX ORDER — HYDROCHLOROTHIAZIDE 25 MG/1
25 TABLET ORAL DAILY
COMMUNITY
Start: 2023-11-29 | End: 2024-01-25 | Stop reason: SDUPTHER

## 2024-01-25 NOTE — PROGRESS NOTES
Chief Complaint  Med Refill and Anxiety (Wanting medicine. )    Subjective        Corby Martinez presents to [facility]  History of Present Illness  Corby is an established patient of Matthias Sheikh MD (5/15/2023 note reviewed) presenting to establish care with a new provider and anxiety, hypertension, mixed hyperlipidemia, lung nodule s/p biopsy.  He has hypertension, mixed hyperlipidemia.  12/4/2023 labs showing normal CBC, CMP.  4/13/2023 labs showing elevated cholesterol and triglycerides, normal A1 C PSA.    HTN: Managed with Norvasc, Cozaar, hydrochlorothiazide.  BP above goal at most recent clinic visits including today.    HLD: Managed with Crestor.    Anxiety: Anxiety related to raising 2 adopted teenage boys.  Limited support system.  Not engaged in outside activities with others.  Positive for feeling restless/keyed up on edge, having difficulty with concentration, irritability, muscle tension, sleep.  Also with changes in appetite.  He screened positive for anxiety symptoms, not depression symptoms.  Feels he would benefit from medication to help with his symptoms.  He also agrees that counseling would be helpful.    ROS: Denies headaches, changes in vision, changes in hearing, sore throat, shortness of breath, palpitations, nausea/vomiting/constipation/diarrhea, abdominal pain, blood in urine or stool, leg swelling.  Denies thoughts of hurting himself or others.    FH:     Medical: Father MI in later years.    Siblings: 1 sister, healthy    Children: 2 teen boys, adopted    Reproductive: Deferred.      SH    Nicotine: 36 PY, Quit 2011. (Hx lung mass, has bx ~5y ago)  Quit <15y ago, got lc screen lst year.    Illicit drugs: none    EtOH: 20/week, beer    Home: Lives at home with wife and 2 boys, feels safe    Work:  Working Maintenance at [employer], [employer].    Social: none    Preventative:    Nicotine: 36 PY, Quit 2011. (Hx benign lung mass,  "had bx ~5y ago)  Quit <15y ago, got LDCT in 2023.  6/18/2019 PFT normal but concern for pulmonary vascular process, improvement with bronchodilator.    PSA: Normal on 4/13/2023    Colonoscopy: Ordered 4/2023    Vaccinations: COVID X4, tetanus 2021, Shingrix 2020, no recent influenza, no RSV, no pneumococcal    Eye: Deferred    Dental: Deferred    Exercise: walks a lot intentionally    Diet: vegetables in diet, minimal fried foods.      Objective   Vital Signs:  /90   Pulse 78   Temp 97.7 °F (36.5 °C) (Temporal)   Resp 16   Ht 172.7 cm (68\")   Wt 73.9 kg (163 lb)   SpO2 96%   BMI 24.78 kg/m²   Estimated body mass index is 24.78 kg/m² as calculated from the following:    Height as of this encounter: 172.7 cm (68\").    Weight as of this encounter: 73.9 kg (163 lb).             Physical Exam  Vitals and nursing note reviewed.   Constitutional:       General: He is not in acute distress.     Appearance: Normal appearance.   HENT:      Head: Normocephalic and atraumatic.   Eyes:      Extraocular Movements: Extraocular movements intact.      Conjunctiva/sclera: Conjunctivae normal.   Cardiovascular:      Rate and Rhythm: Normal rate and regular rhythm.      Pulses: Normal pulses.      Heart sounds: Normal heart sounds. No murmur heard.     No friction rub. No gallop.   Pulmonary:      Effort: Pulmonary effort is normal.      Breath sounds: Normal breath sounds. No wheezing, rhonchi or rales.   Abdominal:      General: Bowel sounds are normal. There is no distension.      Palpations: Abdomen is soft.      Tenderness: There is no abdominal tenderness. There is no right CVA tenderness, left CVA tenderness or guarding.   Musculoskeletal:      Right lower leg: No edema.      Left lower leg: No edema.   Skin:     General: Skin is warm and dry.      Capillary Refill: Capillary refill takes less than 2 seconds.   Neurological:      General: No focal deficit present.      Mental Status: He is alert. Mental status is at " baseline.   Psychiatric:         Attention and Perception: Attention and perception normal.         Mood and Affect: Mood and affect normal.         Speech: Speech normal.         Behavior: Behavior normal. Behavior is cooperative.         Thought Content: Thought content normal. Thought content does not include homicidal or suicidal ideation. Thought content does not include homicidal or suicidal plan.        Result Review :                   Assessment and Plan   Diagnoses and all orders for this visit:    1. Primary hypertension (Primary)  -     amLODIPine (NORVASC) 10 MG tablet; Take 1 tablet by mouth Daily.  Dispense: 90 tablet; Refill: 3  -     hydroCHLOROthiazide (HYDRODIURIL) 25 MG tablet; Take 1 tablet by mouth Daily.  Dispense: 90 tablet; Refill: 3  -     losartan (COZAAR) 50 MG tablet; Take 1 tablet by mouth Daily.  Dispense: 90 tablet; Refill: 3    2. Mixed hyperlipidemia  -     rosuvastatin (Crestor) 20 MG tablet; Take 1 tablet by mouth Daily.  Dispense: 90 tablet; Refill: 3    3. Anxiety  -     Ambulatory Referral to Social Care Services (Amb Case Mgmt)  -     escitalopram (Lexapro) 10 MG tablet; Take 1 tablet by mouth Daily.  Dispense: 90 tablet; Refill: 0    HTN: Since BP continues above goal, patient agreeable to increasing Norvasc.  Continue current Cozaar, hydrochlorothiazide.    HLD: Continue current Crestor.  Discussed diet and recheck lipids at upcoming visit.    Anxiety: Patient agreeable to starting Lexapro.  Discussed possible side effects, especially during the first 2 weeks of treatment.  Encouraged him to call if symptoms or not improved in 2 to 3 weeks.  He also agrees to being contacted by  to help him connect with counseling.  He may have services through employee assistance through his work.  Additional information in AVS and specifically pointed out contact phone numbers for emergency use.    Preventative:    Nicotine: 36 PY, Quit 2011. (Hx benign lung mass, had bx ~5y  ago)  Quit <15y ago, got LDCT in 2023.  6/18/2019 PFT normal but concern for pulmonary vascular process, improvement with bronchodilator.    PSA: Normal on 4/13/2023    Colonoscopy: Ordered 4/2023    Vaccinations: COVID X4, tetanus 2021, Shingrix 2020, no recent influenza, no RSV, no pneumococcal    Eye: Deferred    Dental: Deferred    Home: Lives at home with wife and 2 boys, feels safe    Work:  Working Maintenance at [employer], [employer].    Social: none    Exercise: walks a lot intentionally    Diet: vegetables in diet, minimal fried foods.             Current Outpatient Medications:     hydroCHLOROthiazide (HYDRODIURIL) 25 MG tablet, Take 1 tablet by mouth Daily., Disp: 90 tablet, Rfl: 3    losartan (COZAAR) 50 MG tablet, Take 1 tablet by mouth Daily., Disp: 90 tablet, Rfl: 3    amLODIPine (NORVASC) 10 MG tablet, Take 1 tablet by mouth Daily., Disp: 90 tablet, Rfl: 3    escitalopram (Lexapro) 10 MG tablet, Take 1 tablet by mouth Daily., Disp: 90 tablet, Rfl: 0    rosuvastatin (Crestor) 20 MG tablet, Take 1 tablet by mouth Daily., Disp: 90 tablet, Rfl: 3     I spent 34 minutes caring for Corby on this date of service. This time includes time spent by me in the following activities:preparing for the visit, reviewing tests, obtaining and/or reviewing a separately obtained history, performing a medically appropriate examination and/or evaluation , counseling and educating the patient/family/caregiver, ordering medications, tests, or procedures, referring and communicating with other health care professionals , and documenting information in the medical record  Follow Up   Return in about 7 weeks (around 3/14/2024) for Anxiety.  Patient was given instructions and counseling regarding his condition or for health maintenance advice. Please see specific information pulled into the AVS if appropriate.

## 2024-01-26 ENCOUNTER — REFERRAL TRIAGE (OUTPATIENT)
Dept: CASE MANAGEMENT | Facility: OTHER | Age: 64
End: 2024-01-26
Payer: COMMERCIAL

## 2024-01-31 ENCOUNTER — PATIENT OUTREACH (OUTPATIENT)
Age: 64
End: 2024-01-31
Payer: COMMERCIAL

## 2024-01-31 NOTE — OUTREACH NOTE
Social Work Assessment  Questions/Answers      Flowsheet Row Most Recent Value   Referral Source nursing   Reason for Consult community resources   Preferred Language English   People in Home spouse   Current Living Arrangements home   Potentially Unsafe Housing Conditions none   In the past 12 months has the electric, gas, oil, or water company threatened to shut off services in your home? No   Primary Care Provided by self   Provides Primary Care For child(bethany)   Family Caregiver if Needed none   Quality of Family Relationships stressful   Source of Income salary/wages   Application for Public Assistance obtained public assistance pending number        SDOH updated and reviewed with the patient during this program:  --     Alcohol Use: Alcohol Misuse (6/11/2019)    AUDIT-C     Frequency of Alcohol Consumption: 4 or more times a week     Average Number of Drinks: 3 or 4     Frequency of Binge Drinking: Never        --      Disabilities      --      Employment      Financial Resource Strain: Low Risk  (1/31/2024)    Overall Financial Resource Strain (CARDIA)     Difficulty of Paying Living Expenses: Not hard at all      --     Food Insecurity: Unknown (1/31/2024)    Hunger Vital Sign     Worried About Running Out of Food in the Last Year: Never true      --     Health Literacy: Unknown (1/31/2024)    Education     Preferred Language: English      --     Housing Stability: Not At Risk (1/31/2024)    Housing Stability     Current Living Arrangements: home     Potentially Unsafe Housing Conditions: none      --     Interpersonal Safety: Not At Risk (1/31/2024)    Humiliation, Afraid, Rape, and Kick questionnaire     Fear of Current or Ex-Partner: No     Emotionally Abused: No     Physically Abused: No     Sexually Abused: No      --     Physical Activity: Inactive (1/31/2024)    Exercise Vital Sign     Days of Exercise per Week: 0 days     Minutes of Exercise per Session: 0 min      --      Family and Community Support       --     Stress: Stress Concern Present (1/31/2024)    [employer] - Occupational Stress Questionnaire     Feeling of Stress : To some extent      --     Tobacco Use: Medium Risk (1/30/2024)    Patient History     Smoking Tobacco Use: Former     Smokeless Tobacco Use: Unknown      --     Transportation Needs: No Transportation Needs (1/31/2024)    PRAPARE - Transportation     Lack of Transportation (Medical): No     Lack of Transportation (Non-Medical): No      --     Utilities: Not At Risk (1/31/2024)    [facility] Utilities     Threatened with loss of utilities: No      Continuing Care   [facility] & [place] [address]    Phone: [phone]    Resource for: Depression, Stress   Adrián F/u with pt re his referral for the need for a counselor. ADRIÁN discussed [facility] options outside of [facility]. ADRIÁN provided pt with contact information to [facility], and discussed with pt that he could also use the directory on psychology today to find therapists in private practice. Pt expressed thanks. ADRIÁN will D/c due to program outreach goal being met.     Soledad STRICKLAND -   Ambulatory Case Management    1/31/2024, 12:26 EST

## 2024-03-14 ENCOUNTER — OFFICE VISIT (OUTPATIENT)
Dept: INTERNAL MEDICINE | Facility: CLINIC | Age: 64
End: 2024-03-14
Payer: COMMERCIAL

## 2024-03-14 VITALS
DIASTOLIC BLOOD PRESSURE: 78 MMHG | SYSTOLIC BLOOD PRESSURE: 136 MMHG | HEIGHT: 68 IN | BODY MASS INDEX: 24.92 KG/M2 | OXYGEN SATURATION: 96 % | RESPIRATION RATE: 16 BRPM | WEIGHT: 164.4 LBS | HEART RATE: 75 BPM

## 2024-03-14 DIAGNOSIS — M72.2 PLANTAR FASCIITIS: ICD-10-CM

## 2024-03-14 DIAGNOSIS — G89.29 CHRONIC BILATERAL LOW BACK PAIN WITHOUT SCIATICA: ICD-10-CM

## 2024-03-14 DIAGNOSIS — F41.8 SITUATIONAL ANXIETY: ICD-10-CM

## 2024-03-14 DIAGNOSIS — I10 PRIMARY HYPERTENSION: Primary | ICD-10-CM

## 2024-03-14 DIAGNOSIS — M76.62 ACHILLES TENDINITIS OF LEFT LOWER EXTREMITY: ICD-10-CM

## 2024-03-14 DIAGNOSIS — M54.50 CHRONIC BILATERAL LOW BACK PAIN WITHOUT SCIATICA: ICD-10-CM

## 2024-03-14 PROCEDURE — 99214 OFFICE O/P EST MOD 30 MIN: CPT | Performed by: STUDENT IN AN ORGANIZED HEALTH CARE EDUCATION/TRAINING PROGRAM

## 2024-03-14 RX ORDER — ESCITALOPRAM OXALATE 10 MG/1
10 TABLET ORAL DAILY
Qty: 90 TABLET | Refills: 3 | Status: SHIPPED | OUTPATIENT
Start: 2024-03-14

## 2024-03-14 RX ORDER — IBUPROFEN 200 MG
400 TABLET ORAL EVERY 4 HOURS PRN
Start: 2024-03-14

## 2024-03-14 NOTE — PROGRESS NOTES
"Chief Complaint  Follow-up and Hypertension    Subjective        Corby Martinez presents to [facility] INTER[facility] MEDICINE & PEDIATRICS  History of Present Illness  Corby is an established patient of Matthias Sheikh MD (5/15/2023 note reviewed) presenting for management of anxiety, hypertension.  He has hypertension, mixed hyperlipidemia.  12/4/2023 labs showing normal CBC, CMP.  4/13/2023 labs showing elevated cholesterol and triglycerides, normal A1C,  PSA.     HTN: He denies side effects from increasing Norvasc at last visit.  BP is better in clinic today.  He is taking Norvasc, Cozaar, hydrochlorothiazide.  Reports stress level has improved as well.    Situational anxiety: Teenage children are triggers.  He reports anxiety symptoms significantly improved since starting Lexapro at last visit.  He had a little bit of nausea the first few days he took it but that resolved and is doing well now.  He has not pursued counseling.    Low back pain: Reports often having flares of low back pain usually after lifting something heavy.  No symptoms today.  Asks about management options when he has flares.    Achilles tendinitis: From injury about 2 years ago.  Has worked with podiatry and PT.  Still bothering some but improved.    Plantar fasciitis: Occasional heel pain bilaterally.    Objective   Vital Signs:  /78   Pulse 75   Resp 16   Ht 172.7 cm (68\")   Wt 74.6 kg (164 lb 6.4 oz)   SpO2 96%   BMI 25.00 kg/m²   Estimated body mass index is 25 kg/m² as calculated from the following:    Height as of this encounter: 172.7 cm (68\").    Weight as of this encounter: 74.6 kg (164 lb 6.4 oz).       BMI is within normal parameters. No other follow-up for BMI required.      Physical Exam  Vitals reviewed.   Constitutional:       General: He is not in acute distress.     Appearance: Normal appearance.   HENT:      Head: Normocephalic and atraumatic.   Eyes:      Extraocular Movements: Extraocular " movements intact.      Conjunctiva/sclera: Conjunctivae normal.   Cardiovascular:      Rate and Rhythm: Normal rate and regular rhythm.      Heart sounds: Normal heart sounds. No murmur heard.     No friction rub. No gallop.   Pulmonary:      Effort: Pulmonary effort is normal.      Breath sounds: Normal breath sounds. No wheezing, rhonchi or rales.   Musculoskeletal:      Lumbar back: Normal. No tenderness or bony tenderness.      Right lower leg: No edema.      Left lower leg: No edema.   Neurological:      General: No focal deficit present.      Mental Status: He is alert. Mental status is at baseline.   Psychiatric:         Mood and Affect: Mood normal.         Behavior: Behavior normal.        Result Review :                     Assessment and Plan     Diagnoses and all orders for this visit:    1. Primary hypertension (Primary)    2. Situational anxiety  -     escitalopram (Lexapro) 10 MG tablet; Take 1 tablet by mouth Daily.  Dispense: 90 tablet; Refill: 3    3. Chronic bilateral low back pain without sciatica  -     ibuprofen (Advil) 200 MG tablet; Take 2 tablets by mouth Every 4 (Four) Hours As Needed for Mild Pain (back pain).    4. Achilles tendinitis of left lower extremity    5. Plantar fasciitis    HTN: Continue current Norvasc, Cozaar, hydrochlorothiazide since BP is improved in clinic today.  Consider increasing Cozaar at next visit since BP is still mildly above goal in clinic today.    Situational anxiety: Patient reports doing very well on Lexapro.  Will continue at current dose since he is getting good management of symptoms with this.  I still think he would benefit from counseling.  Encouraged counseling follow-up.    Low back pain: No abnormal findings on exam today.  Recommended over-the-counter Aleve as needed.  Encouraged strengthening/stretching.    Achilles tendinitis: Demonstrated Achilles stretches.  Offered referrals back to PT and podiatry, but patient is continuing to do exercises on  his own.  Further management pending patient request.    Plantar fasciitis: Bilateral.  Demonstrated stretches to help with symptoms.  Offered referral to podiatry,  But patient declines at this time.  He will do stretches and follow-up if he wants further management.         Follow Up     Return in about 3 months (around 6/14/2024) for Annual physical, fasting labs at that time.  Patient was given instructions and counseling regarding his condition or for health maintenance advice. Please see specific information pulled into the AVS if appropriate.

## 2024-05-15 ENCOUNTER — TELEPHONE (OUTPATIENT)
Dept: INTERNAL MEDICINE | Facility: CLINIC | Age: 64
End: 2024-05-15
Payer: COMMERCIAL

## 2024-07-10 ENCOUNTER — OFFICE VISIT (OUTPATIENT)
Dept: INTERNAL MEDICINE | Facility: CLINIC | Age: 64
End: 2024-07-10
Payer: COMMERCIAL

## 2024-07-10 VITALS
HEIGHT: 68 IN | SYSTOLIC BLOOD PRESSURE: 152 MMHG | DIASTOLIC BLOOD PRESSURE: 88 MMHG | HEART RATE: 69 BPM | BODY MASS INDEX: 26.19 KG/M2 | OXYGEN SATURATION: 95 % | WEIGHT: 172.8 LBS | TEMPERATURE: 98.6 F

## 2024-07-10 DIAGNOSIS — E66.3 OVERWEIGHT WITH BODY MASS INDEX (BMI) 25.0-29.9: ICD-10-CM

## 2024-07-10 DIAGNOSIS — Z87.891 PERSONAL HISTORY OF TOBACCO USE, PRESENTING HAZARDS TO HEALTH: ICD-10-CM

## 2024-07-10 DIAGNOSIS — Z13.1 SCREENING FOR DIABETES MELLITUS: ICD-10-CM

## 2024-07-10 DIAGNOSIS — E78.2 MIXED HYPERLIPIDEMIA: ICD-10-CM

## 2024-07-10 DIAGNOSIS — Z00.00 ENCOUNTER FOR ANNUAL PHYSICAL EXAM: Primary | ICD-10-CM

## 2024-07-10 DIAGNOSIS — Z12.11 COLON CANCER SCREENING: ICD-10-CM

## 2024-07-10 DIAGNOSIS — E55.9 VITAMIN D DEFICIENCY: ICD-10-CM

## 2024-07-10 DIAGNOSIS — Z12.5 PROSTATE CANCER SCREENING: ICD-10-CM

## 2024-07-10 DIAGNOSIS — I10 PRIMARY HYPERTENSION: ICD-10-CM

## 2024-07-10 RX ORDER — LOSARTAN POTASSIUM 100 MG/1
100 TABLET ORAL DAILY
Qty: 90 TABLET | Refills: 3 | Status: SHIPPED | OUTPATIENT
Start: 2024-07-10

## 2024-07-10 NOTE — PROGRESS NOTES
"Chief Complaint  Annual exam, hypertension    Subjective        Corby Martinez presents to [facility] MEDIC[facility] PED[facility]ATRICS  History of Present Illness  History of Present Illness  Corby is an established patient presenting for annual exam and management of hypertension.  He has hypertension, mixed hyperlipidemia, anxiety, lung nodule s/p biopsy.  12/4/2023 labs showing normal CBC, CMP.  4/13/2023 labs showing elevated cholesterol and triglycerides, normal A1 C PSA.   Mian Beasley MD, dermatology.      He denies the presence of blood in his stool or urine. He also denies any leg swelling.    Objective   Vital Signs:  /88   Pulse 69   Temp 98.6 °F (37 °C)   Ht 172.7 cm (68\")   Wt 78.4 kg (172 lb 12.8 oz)   SpO2 95%   BMI 26.27 kg/m²   Estimated body mass index is 26.27 kg/m² as calculated from the following:    Height as of this encounter: 172.7 cm (68\").    Weight as of this encounter: 78.4 kg (172 lb 12.8 oz).       BMI is >= 25 and <30. (Overweight) The following options were offered after discussion;: exercise counseling/recommendations and nutrition counseling/recommendations      Physical Exam  Vitals and nursing note reviewed.   Constitutional:       General: He is not in acute distress.     Appearance: Normal appearance.   HENT:      Head: Normocephalic and atraumatic.   Eyes:      Extraocular Movements: Extraocular movements intact.      Conjunctiva/sclera: Conjunctivae normal.   Cardiovascular:      Rate and Rhythm: Normal rate and regular rhythm.      Pulses: Normal pulses.      Heart sounds: Normal heart sounds. No murmur heard.     No friction rub. No gallop.   Pulmonary:      Effort: Pulmonary effort is normal.      Breath sounds: Normal breath sounds. No wheezing, rhonchi or rales.   Abdominal:      General: Bowel sounds are normal. There is no distension.      Palpations: Abdomen is soft.      Tenderness: There is no abdominal tenderness. There is no right CVA " tenderness, left CVA tenderness or guarding.   Musculoskeletal:      Right lower leg: No edema.      Left lower leg: No edema.   Skin:     General: Skin is warm and dry.   Neurological:      General: No focal deficit present.      Mental Status: He is alert. Mental status is at baseline.   Psychiatric:         Mood and Affect: Mood normal.         Behavior: Behavior normal.        Physical Exam      Result Review :          Results               Assessment and Plan     Diagnoses and all orders for this visit:    1. Encounter for annual physical exam (Primary)    2. Primary hypertension  -     CBC & Differential  -     Comprehensive Metabolic Panel  -     TSH Rfx On Abnormal To Free T4  -     losartan (Cozaar) 100 MG tablet; Take 1 tablet by mouth Daily.  Dispense: 90 tablet; Refill: 3    3. Mixed hyperlipidemia  -     Lipid Panel    4. Vitamin D deficiency  -     Vitamin D,25-Hydroxy    5. Screening for diabetes mellitus  -     Hemoglobin A1c    6. Prostate cancer screening  -     PSA Screen    7. Personal history of tobacco use, presenting hazards to health  -     CT chest low dose wo; Future    8. Colon cancer screening  -     Ambulatory Referral For Screening Colonoscopy    9. Overweight with body mass index (BMI) 25.0-29.9      Assessment & Plan  1. Annual checkup.  Reviewed vital signs, medical history, surgical history, family history, social history, medications, most recent lab results, immunizations, relevant preventative screenings.  Discussed the importance of screening test for early detection of disease and ordered appropriate test.  Encouraged healthy lifestyle, diet, and exercise.    2.  Hypertension.  A prescription for losartan 100 mg, to be taken once daily, has been issued. Fasting lab work has been ordered.    Follow-up  A follow-up visit is scheduled for 4 to 5 months from now.    Preventative:    Risks:  Personal history of smoking, benign lung mass.  Mother with hyperlipidemia    Nicotine: 36  PY, Quit . (Hx benign lung mass, had bx ~5y ago)  Quit <15y ago, got LDCT in .  2019 PFT normal but concern for pulmonary vascular process, improvement with bronchodilator.  AAA screening needed.    Illicit drugs: none    EtOH: 14/week, beer, cut back from 20    PSA: Normal on 2023    Colonoscopy: Ordered 2024    Vaccinations: COVID X4, tetanus , Shingrix , no recent influenza, no RSV, no pneumococcal    Eye: annually    Dental: past due, recommended    Home: Lives at home with wife and 2 boys, feels safe    Work:  Maintenance at [employer], [employer].    Social: none    Exercise: walks a lot intentionally    Diet: vegetables in diet, minimal fried foods.  Occasional sweets resulting in recent weight gain.  Patient counseled on improving dietary choices and congratulated on reduction and alcohol consumption which is high in calories.       Follow Up     Return in about 4 months (around 11/10/2024) for HTN.  Patient was given instructions and counseling regarding his condition or for health maintenance advice. Please see specific information pulled into the AVS if appropriate.     Patient or patient representative verbalized consent for the use of Ambient Listening during the visit with  Rocky Gr MD for chart documentation. 7/10/2024  19:18 EDT Low-Dose Lung Cancer CT Screening Visit    CHIEF COMPLAINT:    Shared Decision Making  I am discussing tobacco cessation today with Corby Martinez    SMOKING HISTORY:     Social History     Tobacco Use   Smoking Status Former    Current packs/day: 0.00    Average packs/day: 1 pack/day for 36.0 years (36.0 ttl pk-yrs)    Types: Cigarettes    Start date:     Quit date: 2011    Years since quittin.5   Smokeless Tobacco Not on file   Tobacco Comments    Former smoker quit 8 years ago.  Began smoking at age 15 at 1ppd for 36 years.       SUBJECTIVE:     Corby Martinez is a former smoker quitting 13 years ago with a  36   "pack year history.  he reports no use of alternate forms of tobacco, electronic cigarettes, marijuana or other substances.  Based on the recommendation of the [place] Preventive Services Task Force, this patient is at high risk for lung cancer and a low-dose CT screening scan is recommended.     The patient has had no hemoptysis, unintentional weight loss or increasing shortness of breath. The patient is asymptomatic and has no signs or symptoms of lung cancer.     Together we discussed the potential benefits and potential harms of being screened for lung cancer including the potential for follow up diagnostic testing, risk for over diagnosis, false positive rate and radiation exposure. We also reviewed the patient's smoking history and counseled him on the importance and health benefits of maintaining cigarette smoking abstinence.      OBJECTIVE:    /88   Pulse 69   Temp 98.6 °F (37 °C)   Ht 172.7 cm (68\")   Wt 78.4 kg (172 lb 12.8 oz)   SpO2 95%   BMI 26.27 kg/m²   General: no distress, alert and oriented  Chest: Lung sounds are clear to auscultation, no wheezes, rales or rhonchi, with symmetric air entry. No respiratory distress  Cardiovascular: RRR with no murmur auscultated      Continued Smoking Abstinence discussion:     We discussed that there are a number of resources and interventions to assist with smoking cessation if needed in the future.   On a scale of zero to ten, the patient rates their motivation to stay quit at a 10 out of 10 today.  The patient is confident that they will never smoke in the future.    Recommendations for continued lung cancer screening:      We discussed the NCCN guidelines for lung cancer screening and the patient verbalized understanding that annual screening is recommended until fifteen years beyond smoking as long as they have no other disease or comorbidity that would prevent them from receiving cancer treatments such as surgery should a lung cancer be " detected.  After review of the NCCN guidelines and recommendations for ongoing screening, the patient verbalized understanding of recommendations for follow-up.  The patient has decided to proceed with a Low Dose Lung Cancer Screening CT today.       2 minutes face-to-face spent counseling patient on the continued health benefits of having quit tobacco, maintaining smoking abstinence, smoking cessation strategies and resources, as well as the importance of adherence to annual lung cancer low-dose CT screening.

## 2024-07-11 LAB
25(OH)D3+25(OH)D2 SERPL-MCNC: 26.4 NG/ML (ref 30–100)
ALBUMIN SERPL-MCNC: 4.9 G/DL (ref 3.9–4.9)
ALP SERPL-CCNC: 65 IU/L (ref 44–121)
ALT SERPL-CCNC: 20 IU/L (ref 0–44)
AST SERPL-CCNC: 22 IU/L (ref 0–40)
BASOPHILS # BLD AUTO: 0.1 X10E3/UL (ref 0–0.2)
BASOPHILS NFR BLD AUTO: 1 %
BILIRUB SERPL-MCNC: 0.8 MG/DL (ref 0–1.2)
BUN SERPL-MCNC: 11 MG/DL (ref 8–27)
BUN/CREAT SERPL: 14 (ref 10–24)
CALCIUM SERPL-MCNC: 9.8 MG/DL (ref 8.6–10.2)
CHLORIDE SERPL-SCNC: 90 MMOL/L (ref 96–106)
CHOLEST SERPL-MCNC: 168 MG/DL (ref 100–199)
CO2 SERPL-SCNC: 26 MMOL/L (ref 20–29)
CREAT SERPL-MCNC: 0.78 MG/DL (ref 0.76–1.27)
EGFRCR SERPLBLD CKD-EPI 2021: 99 ML/MIN/1.73
EOSINOPHIL # BLD AUTO: 0.4 X10E3/UL (ref 0–0.4)
EOSINOPHIL NFR BLD AUTO: 4 %
ERYTHROCYTE [DISTWIDTH] IN BLOOD BY AUTOMATED COUNT: 12.4 % (ref 11.6–15.4)
GLOBULIN SER CALC-MCNC: 2.2 G/DL (ref 1.5–4.5)
GLUCOSE SERPL-MCNC: 90 MG/DL (ref 70–99)
HBA1C MFR BLD: 5.8 % (ref 4.8–5.6)
HCT VFR BLD AUTO: 44.5 % (ref 37.5–51)
HDLC SERPL-MCNC: 73 MG/DL
HGB BLD-MCNC: 14.9 G/DL (ref 13–17.7)
IMM GRANULOCYTES # BLD AUTO: 0.1 X10E3/UL (ref 0–0.1)
IMM GRANULOCYTES NFR BLD AUTO: 1 %
LDLC SERPL CALC-MCNC: 83 MG/DL (ref 0–99)
LYMPHOCYTES # BLD AUTO: 2.2 X10E3/UL (ref 0.7–3.1)
LYMPHOCYTES NFR BLD AUTO: 25 %
MCH RBC QN AUTO: 30.3 PG (ref 26.6–33)
MCHC RBC AUTO-ENTMCNC: 33.5 G/DL (ref 31.5–35.7)
MCV RBC AUTO: 90 FL (ref 79–97)
MONOCYTES # BLD AUTO: 0.9 X10E3/UL (ref 0.1–0.9)
MONOCYTES NFR BLD AUTO: 10 %
NEUTROPHILS # BLD AUTO: 5.3 X10E3/UL (ref 1.4–7)
NEUTROPHILS NFR BLD AUTO: 59 %
PLATELET # BLD AUTO: 309 X10E3/UL (ref 150–450)
POTASSIUM SERPL-SCNC: 3.9 MMOL/L (ref 3.5–5.2)
PROT SERPL-MCNC: 7.1 G/DL (ref 6–8.5)
PSA SERPL-MCNC: 1.4 NG/ML (ref 0–4)
RBC # BLD AUTO: 4.92 X10E6/UL (ref 4.14–5.8)
SODIUM SERPL-SCNC: 131 MMOL/L (ref 134–144)
TRIGL SERPL-MCNC: 62 MG/DL (ref 0–149)
TSH SERPL DL<=0.005 MIU/L-ACNC: 3.09 UIU/ML (ref 0.45–4.5)
VLDLC SERPL CALC-MCNC: 12 MG/DL (ref 5–40)
WBC # BLD AUTO: 8.8 X10E3/UL (ref 3.4–10.8)

## 2024-07-11 NOTE — PROGRESS NOTES
Pt aware Dapsone Pregnancy And Lactation Text: This medication is Pregnancy Category C and is not considered safe during pregnancy or breast feeding.

## 2024-07-23 ENCOUNTER — HOSPITAL ENCOUNTER (OUTPATIENT)
Dept: CT IMAGING | Facility: HOSPITAL | Age: 64
Discharge: HOME | End: 2024-07-23
Admitting: STUDENT IN AN ORGANIZED HEALTH CARE EDUCATION/TRAINING PROGRAM
Payer: COMMERCIAL

## 2024-07-23 DIAGNOSIS — Z87.891 PERSONAL HISTORY OF TOBACCO USE, PRESENTING HAZARDS TO HEALTH: ICD-10-CM

## 2024-07-23 PROCEDURE — 71271 CT THORAX LUNG CANCER SCR C-: CPT

## 2024-08-23 ENCOUNTER — PREP FOR SURGERY (OUTPATIENT)
Dept: OTHER | Facility: HOSPITAL | Age: 64
End: 2024-08-23
Payer: COMMERCIAL

## 2024-08-23 DIAGNOSIS — Z12.11 SCREEN FOR COLON CANCER: Primary | ICD-10-CM

## 2024-11-13 ENCOUNTER — HOSPITAL ENCOUNTER (OUTPATIENT)
Dept: GENERAL RADIOLOGY | Facility: HOSPITAL | Age: 64
Discharge: HOME | End: 2024-11-13
Admitting: FAMILY MEDICINE
Payer: COMMERCIAL

## 2024-11-13 ENCOUNTER — OFFICE VISIT (OUTPATIENT)
Dept: FAMILY MEDICINE CLINIC | Facility: CLINIC | Age: 64
End: 2024-11-13
Payer: COMMERCIAL

## 2024-11-13 VITALS
TEMPERATURE: 97.3 F | OXYGEN SATURATION: 96 % | BODY MASS INDEX: 25.31 KG/M2 | WEIGHT: 167 LBS | SYSTOLIC BLOOD PRESSURE: 138 MMHG | HEIGHT: 68 IN | DIASTOLIC BLOOD PRESSURE: 86 MMHG | HEART RATE: 59 BPM

## 2024-11-13 DIAGNOSIS — E78.2 MIXED HYPERLIPIDEMIA: ICD-10-CM

## 2024-11-13 DIAGNOSIS — I10 PRIMARY HYPERTENSION: Primary | ICD-10-CM

## 2024-11-13 DIAGNOSIS — M25.511 ACUTE PAIN OF RIGHT SHOULDER: ICD-10-CM

## 2024-11-13 PROCEDURE — 73030 X-RAY EXAM OF SHOULDER: CPT

## 2024-11-13 PROCEDURE — 99214 OFFICE O/P EST MOD 30 MIN: CPT | Performed by: FAMILY MEDICINE

## 2024-11-13 RX ORDER — NAPROXEN SODIUM 550 MG/1
550 TABLET ORAL 2 TIMES DAILY WITH MEALS
Qty: 30 TABLET | Refills: 0 | Status: SHIPPED | OUTPATIENT
Start: 2024-11-13

## 2024-11-13 RX ORDER — LOSARTAN POTASSIUM 50 MG/1
1 TABLET ORAL DAILY
COMMUNITY
Start: 2024-07-17 | End: 2024-11-13

## 2024-11-13 NOTE — PROGRESS NOTES
"  Subjective   Corby Martinez is a 64 y.o. male who is here for   Chief Complaint   Patient presents with    Establish Care   .   Corby Martinez presents to the office to establish care.  Patient has history of hypertension, hyperlipidemia, and right shoulder pain.  Patient's blood pressure has been well-controlled on amlodipine 10 mg, HCTZ 25 mg, and losartan 100 mg daily.  This has been very effective in managing his hypertension.  Patient denies any chest pain or shortness of breath.  Patient is currently taking Crestor 20 mg daily for his hyperlipidemia.  He states this has been well-tolerated.  Patient denies any chest pain or shortness of breath.  Shoulder Injury   The right shoulder is affected. There was no injury mechanism. The quality of the pain is described as aching. The pain is moderate. Pertinent negatives include no chest pain. The symptoms are aggravated by movement, overhead lifting and palpation. He has tried NSAIDs for the symptoms. The treatment provided mild relief.      History of Present Illness      Review of Systems   Constitutional:  Negative for activity change and appetite change.   Respiratory:  Negative for cough and shortness of breath.    Cardiovascular:  Negative for chest pain and leg swelling.   Musculoskeletal:  Positive for arthralgias.   Skin:  Negative for color change and rash.       Objective   Vitals:    11/13/24 1536   BP: 138/86   BP Location: Left arm   Patient Position: Sitting   Cuff Size: Adult   Pulse: 59   Temp: 97.3 °F (36.3 °C)   SpO2: 96%   Weight: 75.8 kg (167 lb)   Height: 172.7 cm (67.99\")      Physical Exam  Vitals and nursing note reviewed.   Constitutional:       Appearance: Normal appearance. He is normal weight.   HENT:      Head: Normocephalic and atraumatic.   Cardiovascular:      Rate and Rhythm: Normal rate and regular rhythm.      Pulses: Normal pulses.      Heart sounds: No murmur heard.  Pulmonary:      Effort: Pulmonary effort is normal. No " respiratory distress.      Breath sounds: Normal breath sounds. No wheezing.   Musculoskeletal:      Right shoulder: Tenderness and bony tenderness present. No swelling or laceration. Decreased range of motion.      Comments: Decreased range with abduction and internal rotation.    Skin:     General: Skin is warm and dry.   Neurological:      General: No focal deficit present.      Mental Status: He is alert.   Psychiatric:         Mood and Affect: Mood normal.         Thought Content: Thought content normal.       Physical Exam        Assessment & Plan   Assessment & Plan    Diagnoses and all orders for this visit:    1. Primary hypertension (Primary)  Well-controlled.  Continue current medications.  2. Mixed hyperlipidemia  Continue Crestor 20 mg daily.  3. Acute pain of right shoulder  Will obtain x-ray of right shoulder.  Consider starting physical therapy.  If no improvement will consider MRI.  Will also start Anaprox DS 1 p.o. twice daily x 10 days.  -     XR Shoulder 2+ View Right (In Office)  -     naproxen sodium (Anaprox DS) 550 MG tablet; Take 1 tablet by mouth 2 (Two) Times a Day With Meals.  Dispense: 30 tablet; Refill: 0      Results      There are no Patient Instructions on file for this visit.    Medications Discontinued During This Encounter   Medication Reason    losartan (COZAAR) 50 MG tablet Historical Med - Therapy completed        Return if symptoms worsen or fail to improve.             Dayton Baez MD  [place], [place]

## 2024-11-14 ENCOUNTER — PATIENT ROUNDING (BHMG ONLY) (OUTPATIENT)
Dept: FAMILY MEDICINE CLINIC | Facility: CLINIC | Age: 64
End: 2024-11-14
Payer: COMMERCIAL

## 2024-11-15 DIAGNOSIS — M25.511 ACUTE PAIN OF RIGHT SHOULDER: Primary | ICD-10-CM

## 2024-11-15 NOTE — PROGRESS NOTES
X-ray revealed osteoarthritic arthritic changes.  There is also age-related changes in right AC joint.  Will obtain MRI of right shoulder.

## 2024-12-11 ENCOUNTER — TELEPHONE (OUTPATIENT)
Dept: FAMILY MEDICINE CLINIC | Facility: CLINIC | Age: 64
End: 2024-12-11
Payer: COMMERCIAL

## 2024-12-11 NOTE — TELEPHONE ENCOUNTER
Charmaine from [email or web address] called and advised that the MRI that you ordered was denied by insurance due to the patient has not completed 6 weeks of physical therapy.   They would like to know if it is ok to cancel the MRI that is scheduled for 12/13/24. Please Advise.

## 2024-12-12 ENCOUNTER — TELEPHONE (OUTPATIENT)
Dept: FAMILY MEDICINE CLINIC | Facility: CLINIC | Age: 64
End: 2024-12-12
Payer: COMMERCIAL

## 2024-12-12 DIAGNOSIS — M25.511 ACUTE PAIN OF RIGHT SHOULDER: Primary | ICD-10-CM

## 2024-12-12 NOTE — TELEPHONE ENCOUNTER
Jensen with [employer] called and advised that [facility] has declined the MRI that was ordered.for 12/13/24. The insurance has advised that he needs 6 weeks of Physical therapy before they will cover it. Is it ok to cancel MRI for 12/13/24. Please advise

## 2024-12-12 NOTE — TELEPHONE ENCOUNTER
Please inform patient that he will not be able to have MRI without doing physical therapy first.  If patient wishes we will make referral for physical therapy.

## 2024-12-13 NOTE — TELEPHONE ENCOUNTER
Attempted to call scheduling two times. Waited on hold for over 5 minutes each time without anyone answering calls.

## 2024-12-13 NOTE — TELEPHONE ENCOUNTER
Spoke to patient and advised that his insurance has denied the MRI due to patient needs to have tried 6 weeks of physical therapy.   Patient is agreeable to trying physical therapy. Patient would like to go to [facility] next to our office. Advised patient that someone would reach out to schedule his physical therapy once Dr. Baez has placed the order.

## 2024-12-31 ENCOUNTER — TELEPHONE (OUTPATIENT)
Dept: FAMILY MEDICINE CLINIC | Facility: CLINIC | Age: 64
End: 2024-12-31
Payer: COMMERCIAL

## 2024-12-31 DIAGNOSIS — M25.511 ACUTE PAIN OF RIGHT SHOULDER: Primary | ICD-10-CM

## 2024-12-31 NOTE — TELEPHONE ENCOUNTER
Please see message below from patient's spouse:    PATIENTS SPOUSE STATES THAT THEY GOT A LETTER FROM INSURANCE SAYING THAT THEY HAVE APPROVED THE MRI AND THEY WOULD LIKE A NEW ORDER SENT TO THE HOSPITAL.     Please advise.

## 2024-12-31 NOTE — TELEPHONE ENCOUNTER
Caller: Tori Martinez    Relationship: Emergency Contact    Best call back number: [phone]    What orders are you requesting (i.e. lab or imaging): MRI OR ARM JOINT WITHOUT CONTRAST     In what timeframe would the patient need to come in: AS SOON AS POSSIBLE     Additional notes: PATIENTS SPOUSE STATES THAT THEY GOT A LETTER FROM INSURANCE SAYING THAT THEY HAVE APPROVED THE MRI AND THEY WOULD LIKE A NEW ORDER SENT TO THE HOSPITAL.

## 2025-01-17 ENCOUNTER — TELEPHONE (OUTPATIENT)
Dept: FAMILY MEDICINE CLINIC | Facility: CLINIC | Age: 65
End: 2025-01-17
Payer: COMMERCIAL

## 2025-01-17 NOTE — TELEPHONE ENCOUNTER
Cecilia with [facility] states patient's MRI was denied. They are requesting a peer to peer. If Dr. Baez would like to do it the telephone number is [phone], reference order #: [id number]. Please let me know what Dr. Baez decides.     Would you like to do a Peer to Peer?

## 2025-01-17 NOTE — TELEPHONE ENCOUNTER
He will likely need physical therapy prior to having MRI.  Will place referral to physical therapy if patient is willing.

## 2025-01-17 NOTE — TELEPHONE ENCOUNTER
Cecilia with [facility] states patient's MRI was denied. They are requesting a peer to peer. If Dr. Baez would like to do it the telephone number is [phone], reference order #: [id number]. Please let me know what Dr. Baez decides.      Would you like to do a Peer to Peer?

## 2025-01-17 NOTE — TELEPHONE ENCOUNTER
Cecilia with [facility] states patient's MRI was denied. They are requesting a peer to peer. If Dr. Baez would like to do it the telephone number is [phone], reference order #: [id number]. Please let me know what Dr. Baez decides.

## 2025-01-21 ENCOUNTER — HOSPITAL ENCOUNTER (OUTPATIENT)
Dept: MRI IMAGING | Facility: HOSPITAL | Age: 65
Discharge: HOME | End: 2025-01-21
Admitting: FAMILY MEDICINE
Payer: COMMERCIAL

## 2025-01-21 DIAGNOSIS — M25.511 ACUTE PAIN OF RIGHT SHOULDER: ICD-10-CM

## 2025-01-21 PROCEDURE — 73221 MRI JOINT UPR EXTREM W/O DYE: CPT

## 2025-01-22 DIAGNOSIS — S43.431A SUPERIOR GLENOID LABRUM LESION OF RIGHT SHOULDER, INITIAL ENCOUNTER: Primary | ICD-10-CM

## 2025-01-22 NOTE — PROGRESS NOTES
MRI revealed moderate rotator cuff tendinopathy without evidence of a complete tear.  There is a labral tear.  Also noted AC arthritis as well as tendinopathy of the long head of the biceps.  Will refer to orthopedics for evaluation and treatment.  Does patient have a particular orthopedist that he would like to see?

## 2025-02-04 NOTE — PROGRESS NOTES
New Shoulder      Patient: Corby Martinez        YOB: 1960    Medical Record Number: [id number]        Chief Complaints: Right shoulder pain      History of Present Illness: This is a 65-year-old male who is right-hand-dominant presents with right shoulder pain is been ongoing for 3 months no history injury change in activity he works in maintenance at [employer] he has no night pain he has tried topical things he does come with an MRI which shows some tendinopathy of the cuff as well as a SLAP tear most of his pain is in the area of the cuff past medical history is listed below and reviewed by me      Allergies: No Known Allergies    Medications:   Home Medications:  Current Outpatient Medications on File Prior to Visit   Medication Sig    amLODIPine (NORVASC) 10 MG tablet Take 1 tablet by mouth Daily.    escitalopram (Lexapro) 10 MG tablet Take 1 tablet by mouth Daily.    hydroCHLOROthiazide (HYDRODIURIL) 25 MG tablet Take 1 tablet by mouth Daily.    losartan (Cozaar) 100 MG tablet Take 1 tablet by mouth Daily.    rosuvastatin (Crestor) 20 MG tablet Take 1 tablet by mouth Daily.    [DISCONTINUED] ibuprofen (Advil) 200 MG tablet Take 2 tablets by mouth Every 4 (Four) Hours As Needed for Mild Pain (back pain).    [DISCONTINUED] naproxen sodium (Anaprox DS) 550 MG tablet Take 1 tablet by mouth 2 (Two) Times a Day With Meals.     No current facility-administered medications on file prior to visit.     Current Medications:  Scheduled Meds:  Continuous Infusions:No current facility-administered medications for this visit.    PRN Meds:.    Past Medical History:   Diagnosis Date    Allergic     Ankle sprain 2020    Anxiety     Hyperlipidemia     Hypertension     Low back strain     Lung mass     Pneumonia         Past Surgical History:   Procedure Laterality Date    CORONARY STENT PLACEMENT      HERNIA REPAIR          Social History     Occupational History    Not on file   Tobacco Use    Smoking  "status: Former     Current packs/day: 0.00     Average packs/day: 1 pack/day for 36.0 years (36.0 ttl pk-yrs)     Types: Cigarettes     Start date: 1975     Quit date:      Years since quittin.1    Smokeless tobacco: Never    Tobacco comments:     Former smoker quit 8 years ago.  Began smoking at age 15 at 1ppd for 36 years.   Vaping Use    Vaping status: Never Used   Substance and Sexual Activity    Alcohol use: Yes     Alcohol/week: 18.0 standard drinks of alcohol     Types: 18 Cans of beer per week     Comment: Reports 18 beers weekly.    Drug use: Never    Sexual activity: Not Currently     Partners: Female     Birth control/protection: None      Social History     Social History Narrative    Not on file        Family History   Problem Relation Age of Onset    Skin cancer Mother     Hyperlipidemia Mother     Asthma Father              Review of Systems:     Review of Systems      Physical Exam: 65 y.o. male  General Appearance:    Alert, cooperative, in no acute distress                   Vitals:    25 0929   Temp: 97.3 °F (36.3 °C)   TempSrc: Temporal   Weight: 78.3 kg (172 lb 9.6 oz)   Height: 175.3 cm (69\")   PainSc:   3   PainLoc: Shoulder      Patient is alert and read ×3 no acute distress appears her above-listed at height weight and age.  Affect is normal respiratory rate is normal unlabored. Heart rate regular rate rhythm, sclera, dentition and hearing are normal for the purpose of this exam.    Ortho Exam  Physical exam of the right shoulder reveals no overlying skin changes no lymphedema no lymphadenopathy.  Patient has active flexion 180 with mild symptoms abduction is similar external rotation is to 50 and internal rotation to the upper lumbar spine with mild symptoms.  Patient has good rotator cuff strength 4+ over 5 with isometric strength testing with pain.  Patient has a positive impingement and a positive Huber sign.  Patient has good cervical range of motion which is full " and asymptomatic no radicular symptoms.  Patient has a normal elbow exam.  Good distal pulses are present  Patient has pain with overhead activity and a positive Neer sign and a positive empty can sign , a positive drop arm and a definitive painful arc   Large Joint Arthrocentesis: R subacromial bursa  Date/Time: 2/6/2025 9:58 AM  Consent given by: patient  Site marked: site marked  Timeout: Immediately prior to procedure a time out was called to verify the correct patient, procedure, equipment, support staff and site/side marked as required   Supporting Documentation  Indications: pain   Procedure Details  Location: shoulder - R subacromial bursa  Preparation: Patient was prepped and draped in the usual sterile fashion  Needle gauge: 21G.  Approach: posterior  Medications administered: 80 mg methylPREDNISolone acetate 80 MG/ML; 2 mL lidocaine PF 1% 1 %  Patient tolerance: patient tolerated the procedure well with no immediate complications              Radiology:  Axillary Lateral of the right shoulder were ordered/reviewed to evauate shoulder pain.  I did compare to an AP and internal rotation view that they did that send epic he has some acromioclavicular arthritis otherwise good maintenance with joint space I did review his MRI results of which are as above  Imaging Results (Most Recent)       Procedure Component Value Units Date/Time    XR Shoulder 2+ View Right [[id number]] Resulted: 02/06/25 0959     Updated: 02/06/25 0959    Impression:      Ordering physician's impression is located in the Encounter Note dated 02/06/25. X-ray performed in the DR room.            Assessment/Plan: Right shoulder pain he does have a SLAP tear by MRI his exam speaks more towards rotator cuff plan is to proceed with subacromial injection as a diagnostic and therapeutic tool I will start him on meloxicam with strict precautions for short period of time see him back for 5 weeks in the unlikely event he fails to improve in a lasting  fashion we will pursue other means of testing  Cortisone Injection. See procedure note.  Cortisone Injection for DIAGNOSTIC and THERAPUTIC purposes.  Prescription medication given with instructions, warnings, and precautions.

## 2025-02-06 ENCOUNTER — HOSPITAL ENCOUNTER (OUTPATIENT)
Dept: GENERAL RADIOLOGY | Facility: HOSPITAL | Age: 65
Discharge: HOME | End: 2025-02-06
Admitting: NURSE PRACTITIONER
Payer: COMMERCIAL

## 2025-02-06 ENCOUNTER — OFFICE VISIT (OUTPATIENT)
Dept: ORTHOPEDIC SURGERY | Facility: CLINIC | Age: 65
End: 2025-02-06
Payer: COMMERCIAL

## 2025-02-06 VITALS — WEIGHT: 172.6 LBS | BODY MASS INDEX: 25.56 KG/M2 | HEIGHT: 69 IN | TEMPERATURE: 97.3 F

## 2025-02-06 DIAGNOSIS — R09.89 RHONCHI AT LEFT LUNG BASE: ICD-10-CM

## 2025-02-06 DIAGNOSIS — S43.431A SUPERIOR GLENOID LABRUM LESION OF RIGHT SHOULDER, INITIAL ENCOUNTER: ICD-10-CM

## 2025-02-06 DIAGNOSIS — M75.41 IMPINGEMENT SYNDROME OF RIGHT SHOULDER: Primary | ICD-10-CM

## 2025-02-06 PROCEDURE — 71046 X-RAY EXAM CHEST 2 VIEWS: CPT

## 2025-02-06 RX ORDER — MELOXICAM 15 MG/1
TABLET ORAL
Qty: 14 TABLET | Refills: 0 | Status: SHIPPED | OUTPATIENT
Start: 2025-02-06

## 2025-02-06 RX ORDER — METHYLPREDNISOLONE ACETATE 80 MG/ML
80 INJECTION, SUSPENSION INTRA-ARTICULAR; INTRALESIONAL; INTRAMUSCULAR; SOFT TISSUE
Status: COMPLETED | OUTPATIENT
Start: 2025-02-06 | End: 2025-02-06

## 2025-02-06 RX ORDER — LIDOCAINE HYDROCHLORIDE 10 MG/ML
2 INJECTION, SOLUTION EPIDURAL; INFILTRATION; INTRACAUDAL; PERINEURAL
Status: COMPLETED | OUTPATIENT
Start: 2025-02-06 | End: 2025-02-06

## 2025-02-06 RX ADMIN — METHYLPREDNISOLONE ACETATE 80 MG: 80 INJECTION, SUSPENSION INTRA-ARTICULAR; INTRALESIONAL; INTRAMUSCULAR; SOFT TISSUE at 09:58

## 2025-02-06 RX ADMIN — LIDOCAINE HYDROCHLORIDE 2 ML: 10 INJECTION, SOLUTION EPIDURAL; INFILTRATION; INTRACAUDAL; PERINEURAL at 09:58

## 2025-02-14 ENCOUNTER — HOSPITAL ENCOUNTER (OUTPATIENT)
Dept: PHYSICAL THERAPY | Facility: HOSPITAL | Age: 65
Setting detail: THERAPIES SERIES
Discharge: HOME | End: 2025-02-14
Payer: COMMERCIAL

## 2025-02-14 DIAGNOSIS — M75.41 IMPINGEMENT SYNDROME OF RIGHT SHOULDER: Primary | ICD-10-CM

## 2025-02-14 PROCEDURE — 97161 PT EVAL LOW COMPLEX 20 MIN: CPT | Performed by: PHYSICAL THERAPIST

## 2025-02-14 NOTE — THERAPY EVALUATION
Outpatient Physical Therapy Ortho Initial Evaluation   [place]     Patient Name: Corby Martinez  : 1960  MRN: [id number]  Today's Date: 2025      Visit Date: 2025    Patient Active Problem List   Diagnosis    Former cigarette smoker    Squamous cell carcinoma of skin of left hand    Primary hypertension    Mixed hyperlipidemia    Chronic bilateral low back pain without sciatica    Situational anxiety    Achilles tendinitis of left lower extremity    Plantar fasciitis        Past Medical History:   Diagnosis Date    Allergic     Ankle sprain 2020    Anxiety     Hyperlipidemia     Hypertension     Low back strain     Lung mass     Pneumonia         Past Surgical History:   Procedure Laterality Date    CORONARY STENT PLACEMENT      HERNIA REPAIR         Visit Dx:     ICD-10-CM ICD-9-CM   1. Impingement syndrome of right shoulder  M75.41 726.2          Patient History       Row Name 25 0600 25 1718          History    Chief Complaint Difficulty with daily activities;Joint stiffness;Pain;Tightness  -GC Difficulty with daily activities;Joint stiffness;Pain;Tightness  -GC (r) patient (t)     Type of Pain Shoulder pain  -GC Shoulder pain  -GC (r) patient (t)     Date Current Problem(s) Began 25  -GC 25  -GC (r) patient (t)     Brief Description of Current Complaint Pt reports an approximate 6 month history of right shoulder pain with no real precipitating injury. He was seen by Dr. Dunn who did x-rays and MRI and found a SLAP lesion right shoulder. HE was referred to Dr. Woodall who injected his shoulder which did give him some pain relief. He is now referred for therapy.  -GC Shoulder pain  -GC (r) patient (t)     Patient/Caregiver Goals Relieve pain;Improve mobility;Improve strength  -GC Relieve pain;Improve mobility;Improve strength  -GC (r) patient (t)     Hand Dominance right-handed  -GC right-handed  -GC (r) patient (t)     Patient seeing anyone else for problem(s)? --   -GC No  -GC (r) patient (t)     What clinical tests have you had for this problem? X-ray;MRI  -GC X-ray;MRI  -GC (r) patient (t)     Results of Clinical Tests SLAP lesion, rotator cuff tendinopathy  -GC --        Pain     Pain Location Shoulder  right  -GC --     Pain at Best 0  -GC --     Pain at Worst 5  -GC --     What Performance Factors Make the Current Problem(s) WORSE? Pt c/o pain with reaching up, out, and back  -GC --     What Performance Factors Make the Current Problem(s) BETTER? pt has no pain at rest  -GC --     Difficulties at work? Pt has pain with reaching and lifting at work  -GC --        Fall Risk Assessment    Any falls in the past year: No  -GC No  -GC (r) patient (t)        Services    Prior Rehab/Home Health Experiences No  -GC No  -GC (r) patient (t)     Are you currently receiving Home Health services No  -GC No  -GC (r) patient (t)     Do you plan to receive Home Health services in the near future No  -GC No  -GC (r) patient (t)        Daily Activities    Primary Language English  - English  -GC (r) patient (t)     Are you able to read Yes  -GC Yes  -GC (r) patient (t)     Are you able to write Yes  -GC Yes  -GC (r) patient (t)     How does patient learn best? Reading;Demonstration;Pictures/Video  -GC Reading;Demonstration;Pictures/Video  -GC (r) patient (t)     Teaching needs identified Home Exercise Program;Management of Condition  -GC --     Patient is concerned about/has problems with Repetitive movements of the hand, arm, shoulder;Reaching over head;Performing job responsibilities/community activities (work, school,;Performing home management (household chores, shopping, care of dependents);Grasping objects lifting  -GC --     Does patient have problems with the following? None  -GC --     Barriers to learning None  -GC --     Pt Participated in POC and Goals Yes  -GC --        Safety    Are you being hurt, hit, or frightened by anyone at home or in your life? No  -GC No  -GC (r)  patient (t)     Are you being neglected by a caregiver No  -GC No  -GC (r) patient (t)     Have you had any of the following issues with --  -GC Anxiety  -GC (r) patient (t)               User Key  (r) = Recorded By, (t) = Taken By, (c) = Cosigned By      Initials Name Provider Type    GC Sukhdeep Anderson, PT Physical Therapist    patient Corby Martinez --                     PT Ortho       Row Name 02/14/25 0600       Shoulder Girdle Accessory Motions    Posterior glide of humerus Right:;Hypomobile  -GC    Anterior glide of humerus Right:;WNL  -GC    Inferior glide of humerus Right:;Hypomobile  -GC       Shoulder Impingement/Rotator Cuff Special Tests    Huber-Gigi Test (RC Lesion vs. Bursitis) Right:;Positive  -GC    Neer Impingement Test (RC Lesion vs. Bursitis) Right:;Positive  -GC    Drop Arm Test (Full Thickness RC Lesion) Right:;Negative  -GC       Shoulder Laxity/Instability Special Tests    Load and Shift Test Right:;Negative  -GC    Sulcus Sign, 0 Degrees Right:;Negative  -GC    Anterior Apprehension/Relocation Test, at 90 Degrees Right:;Negative  -GC       Biceps/Labral Special Tests    Churchill's Test (Labral Test) Right:;Positive  -GC       Shoulder Girdle Palpation    Subacromial Space Right:;Tender  -GC    Supraspinatus Insertion Right:;Tender  -GC    Long Head of Biceps Right:;Tender  -GC    Greater Tubercule Right:;Tender  -GC       Right Upper Ext    Rt Shoulder Abduction AROM 111 degrees  -GC    Rt Shoulder Flexion AROM 152 degrees  -GC    Rt Shoulder External Rotation AROM 47 degrees  -GC    Rt Shoulder Internal Rotation AROM 61 degrees  -GC       MMT (Manual Muscle Testing)    General MMT Comments scaption strength is 4/5  -GC       MMT Right Upper Ext    Rt Shoulder Flexion MMT, Gross Movement (5/5) normal  -GC    Rt Shoulder Extension MMT, Gross Movement (5/5) normal  -GC    Rt Shoulder ABduction MMT, Gross Movement (5/5) normal  -GC    Rt Shoulder ADduction MMT, Gross Movement (5/5)  normal  -GC    Rt Shoulder Internal Rotation MMT, Gross Movement (4+/5) good plus  -GC    Rt Shoulder External Rotation MMT, Gross Movement (4/5) good  -GC    Rt Scapular Elevation MMT, Gross Movement (5/5) normal  -GC    Rt Scapular ADduction MMT, Gross Movement (4+/5) good plus  -GC    Rt Scapular ADduction & Depression MMT, Gross Movement (4+/5) good plus  -GC    Rt Scapular ADD & Medial Rotation MMT, Gross Movement (4+/5) good plus  -GC    Rt Scapular ABD & Lateral Rotation MMT, Gross Movement (4+/5) good plus  -GC    Rt Elbow Flexion MMT, Gross Movement: (5/5) normal  -GC    Rt Elbow Extension MMT, Gross Movement: (5/5) normal  -GC       Sensation    Light Touch No apparent deficits  -GC              User Key  (r) = Recorded By, (t) = Taken By, (c) = Cosigned By      Initials Name Provider Type    GC Sukhdeep Anderson, PT Physical Therapist                                Therapy Education  Given: HEP, Symptoms/condition management, Pain management  Program: New  How Provided: Verbal, Demonstration, Written  Provided to: Patient  Level of Understanding: Teach back education performed, Verbalized, Demonstrated      PT OP Goals       Row Name 02/14/25 0600          PT Short Term Goals    STG Date to Achieve 02/28/25  -     STG 1 Decrease right shoulder pain to 2-3/10 with activity.  -     STG 2 Increase right shoulder AROM to 165 degrees FLEX, 145 degrees ABD, 60 degrees ER, and 75 degrees IR with testing.  -     STG 3 Increase right shoulder girdle strength to at least 4+/5 all planes with testing.  -     STG 4 Pt will be independent with his HEP issued by this therapist.  -        Long Term Goals    LTG Date to Achieve 03/14/25  -     LTG 1 Decrease right shoulder pain to 0-1/10 with activity.  -     LTG 2 Increase right shoulder AROM to WFL all planes with testing.  -     LTG 3 Increase right shoulder girdle strength to 5/5 all planes with testing.  -     LTG 4 Pt will be independent with all  ADLs without pain.  -        Time Calculation    PT Goal Re-Cert Due Date 03/14/25  -GC               User Key  (r) = Recorded By, (t) = Taken By, (c) = Cosigned By      Initials Name Provider Type     Sukhdeep Anderson, PT Physical Therapist                     PT Assessment/Plan       Row Name 02/14/25 0600          PT Assessment    Functional Limitations Limitation in home management;Limitations in community activities;Limitations in functional capacity and performance;Performance in leisure activities;Performance in work activities  -     Impairments Range of motion;Pain;Muscle strength;Joint mobility  -GC     Assessment Comments Pt presents with an approximate 6 month history of right shoulder pain with reaching up, back, and out. He rates this pain up to 5/10. He recieved a cortisone injection that helped decrease his pain significantly. He still has decreased right shoulder ROM, decreased glenohumeral joint mobility, decreaesd right shoulder girdle strength, and decreased funciton secondary to the above.  -     Rehab Potential Good  -GC     Patient/caregiver participated in establishment of treatment plan and goals Yes  -GC     Patient would benefit from skilled therapy intervention Yes  -GC        PT Plan    PT Frequency 1x/week;2x/week  -     Predicted Duration of Therapy Intervention (PT) 4 weeks  -     Planned CPT's? PT EVAL LOW COMPLEXITY: 34320;PT THER PROC EA 15 MIN: 68674;PT MANUAL THERAPY EA 15 MIN: 82569;PT HOT OR COLD PACK TREAT MCARE  -     PT Plan Comments Pt is to continue his HEP 2 daily  -               User Key  (r) = Recorded By, (t) = Taken By, (c) = Cosigned By      Initials Name Provider Type     Sukhdeep Anderson PT Physical Therapist                     Modalities       Row Name 02/14/25 0600             Moist Heat    MH Applied Yes  -GC      Location right shoulder  -      PT Moist Heat Minutes 10  -GC      MH Prior to Rx Yes  -GC                User Key  (r) = Recorded  By, (t) = Taken By, (c) = Cosigned By      Initials Name Provider Type    GC Sukhdeep Anderson, PT Physical Therapist                   OP Exercises       Row Name 02/14/25 0600             Exercise 1    Exercise Name 1 sidelying ER  -GC      Reps 1 25  -GC      Time 1 2#  -GC         Exercise 2    Exercise Name 2 scaption up  -GC      Reps 2 25  -GC      Time 2 2#  -GC         Exercise 3    Exercise Name 3 scaption down  -GC      Reps 3 25  -GC      Time 3 2#  -GC         Exercise 4    Exercise Name 4 shoulder ER vs theraband  -GC      Reps 4 25  -GC      Time 4 black  -GC         Exercise 5    Exercise Name 5 shoulder IR vs theraband  -GC      Reps 5 25  -GC      Time 5 black  -GC         Exercise 6    Exercise Name 6 sleeper stretch  -GC      Reps 6 10  -GC      Time 6 10 secs  -GC                User Key  (r) = Recorded By, (t) = Taken By, (c) = Cosigned By      Initials Name Provider Type     Sukhdeep Anderson, PT Physical Therapist                  Manual Rx (Last 36 Hours)       Manual Treatments       Row Name 02/14/25 0600             Manual Rx 1    Manual Rx 1 Location right shoulder  -GC      Manual Rx 1 Type GH joint mobilizations POST and INF  -GC      Manual Rx 1 Grade grade II  -GC      Manual Rx 1 Duration 5 min  -GC         Manual Rx 2    Manual Rx 2 Location right shoulder  -GC      Manual Rx 2 Type passive stretches in FLEX-ABD-ER-IR  -GC      Manual Rx 2 Duration 10 min  -GC                User Key  (r) = Recorded By, (t) = Taken By, (c) = Cosigned By      Initials Name Provider Type     Sukhdeep Anderson, PT Physical Therapist                                Outcome Measure Options: Quick DASH  Quick DASH  Open a tight or new jar.: Moderate Difficulty  Do heavy household chores (e.g., wash walls, wash floors): Mild Difficulty  Carry a shopping bag or briefcase: Mild Difficulty  Wash your back: Moderate Difficulty  Use a knife to cut food: No Difficulty  Recreational activities in which you take some  force or impact through your arm, should or hand (e.g. golf, hammering, tennis, etc.): Moderate Difficulty  During the past week, to what extent has your arm, shoulder, or hand problem interfered with your normal social activites with family, friends, neighbors or groups?: Slightly  During the past week, were you limited in your work or other regular daily activities as a result of your arm, shoulder or hand problem?: Slightly Limited  Arm, Shoulder, or hand pain: Mild  Tingling (pins and needles) in your arm, shoulder, or hand: None  During the past week, how much difficulty have you had sleeping because of the pain in your arm, shoulder or hand?: No difficulty  Number of Questions Answered: 11  Quick DASH Score: 25  Work Module (Optional)  Using your usual technique for your work?: Mild Difficulty  Doing your usual work because of arm, shoulder or hand pain?: Mild Difficulty  Doing your work as well as you would like?: Mild Difficulty  Spending your usual amount of time doing your work?: Mild Difficulty  Work Module Score: 25         Time Calculation:     Start Time: 0600  Stop Time: 0651  Time Calculation (min): 51 min  Untimed Charges  PT Moist Heat Minutes: 10  Total Minutes  Untimed Charges Total Minutes: 10   Total Minutes: 10     Therapy Charges for Today       Code Description Service Date Service Provider Modifiers Qty    98879982407 HC PT EVAL LOW COMPLEXITY 3 2/14/2025 Sukhdeep Anderson, PT GP 1            PT G-Codes  Outcome Measure Options: Quick DASH  Quick DASH Score: 25         Sukhdeep Anderson, PT  2/14/2025

## 2025-02-19 ENCOUNTER — HOSPITAL ENCOUNTER (OUTPATIENT)
Dept: PHYSICAL THERAPY | Facility: HOSPITAL | Age: 65
Setting detail: THERAPIES SERIES
Discharge: HOME | End: 2025-02-19
Payer: COMMERCIAL

## 2025-02-19 DIAGNOSIS — M75.41 IMPINGEMENT SYNDROME OF RIGHT SHOULDER: Primary | ICD-10-CM

## 2025-02-19 PROCEDURE — 97140 MANUAL THERAPY 1/> REGIONS: CPT

## 2025-02-19 PROCEDURE — 97110 THERAPEUTIC EXERCISES: CPT

## 2025-02-19 NOTE — THERAPY TREATMENT NOTE
Outpatient Physical Therapy Ortho Treatment Note   Sy     Patient Name: Corby Martinez  : 1960  MRN: [id number]  Today's Date: 2025      Visit Date: 2025    Visit Dx:    ICD-10-CM ICD-9-CM   1. Impingement syndrome of right shoulder  M75.41 726.2       Patient Active Problem List   Diagnosis    Former cigarette smoker    Squamous cell carcinoma of skin of left hand    Primary hypertension    Mixed hyperlipidemia    Chronic bilateral low back pain without sciatica    Situational anxiety    Achilles tendinitis of left lower extremity    Plantar fasciitis        Past Medical History:   Diagnosis Date    Allergic     Ankle sprain 2020    Anxiety     Hyperlipidemia     Hypertension     Low back strain     Lung mass     Pneumonia         Past Surgical History:   Procedure Laterality Date    CORONARY STENT PLACEMENT      HERNIA REPAIR                          PT Assessment/Plan       Row Name 25 0500          PT Assessment    Assessment Comments Patient presents with improved right shoulder symptoms this morning. Continued with manual therapy techniques, flexibility and strengthening exercises today and patient tolerated this well. Plan to progress patient as tolerated.  -AS        PT Plan    PT Plan Comments Continue with current treatment plan.  -AS               User Key  (r) = Recorded By, (t) = Taken By, (c) = Cosigned By      Initials Name Provider Type    AS Marco Kennedy, PT Physical Therapist                     Modalities       Row Name 25 0500             Moist Heat    MH Applied Yes  -AS      Location right shoulder  -AS      PT Moist Heat Minutes 10  -AS      MH Prior to Rx Yes  -AS         Functional Testing    Outcome Measure Options Quick DASH  -AS                User Key  (r) = Recorded By, (t) = Taken By, (c) = Cosigned By      Initials Name Provider Type    AS Marco Kennedy, PT Physical Therapist                   OP Exercises       Row Name  "02/19/25 0638 02/19/25 0500          Subjective    Subjective Comments -- Patient states his right shoulder is \"feeling better.\" He states he is being compliant with his HEP without issues.  -AS        Total Minutes    08098 - PT Therapeutic Exercise Minutes 15  -AS --     86025 - PT Manual Therapy Minutes 15  -AS --        Exercise 1    Exercise Name 1 -- sidelying ER  -AS     Reps 1 -- 25  -AS     Time 1 -- 2#  -AS        Exercise 2    Exercise Name 2 -- scaption up  -AS     Reps 2 -- 25  -AS     Time 2 -- 2#  -AS        Exercise 3    Exercise Name 3 -- scaption down  -AS     Reps 3 -- 25  -AS     Time 3 -- 2#  -AS        Exercise 4    Exercise Name 4 -- shoulder ER vs theraband  -AS     Reps 4 -- 25  -AS     Time 4 -- black  -AS        Exercise 5    Exercise Name 5 -- shoulder IR vs theraband  -AS     Reps 5 -- 25  -AS     Time 5 -- black  -AS        Exercise 6    Exercise Name 6 -- sleeper stretch  -AS     Reps 6 -- 10  -AS     Time 6 -- 10 secs  -AS     Additional Comments -- Pre and Post exercises  -AS               User Key  (r) = Recorded By, (t) = Taken By, (c) = Cosigned By      Initials Name Provider Type    AS Marco Kennedy, PT Physical Therapist                             Manual Rx (Last 36 Hours)       Manual Treatments       Row Name 02/19/25 0638 02/19/25 0500          Total Minutes    99298 - PT Manual Therapy Minutes 15  -AS --        Manual Rx 1    Manual Rx 1 Location -- right shoulder  -AS     Manual Rx 1 Type -- GH joint mobilizations POST and INF  -AS     Manual Rx 1 Grade -- grade II  -AS     Manual Rx 1 Duration -- 5 min  -AS        Manual Rx 2    Manual Rx 2 Location -- right shoulder  -AS     Manual Rx 2 Type -- passive stretches in FLEX-ABD-ER-IR  -AS     Manual Rx 2 Duration -- 10 min  -AS               User Key  (r) = Recorded By, (t) = Taken By, (c) = Cosigned By      Initials Name Provider Type    AS Marco Kennedy, PT Physical Therapist                         "     Outcome Measure Options: Quick DASH         Time Calculation:   Start Time: 0550  Stop Time: 0633  Time Calculation (min): 43 min  Timed Charges  50834 - PT Therapeutic Exercise Minutes: 15  91461 - PT Manual Therapy Minutes: 15  Untimed Charges  PT Moist Heat Minutes: 10  Total Minutes  Timed Charges Total Minutes: 30  Untimed Charges Total Minutes: 10   Total Minutes: 40  Therapy Charges for Today       Code Description Service Date Service Provider Modifiers Qty    34256008195 HC PT THER PROC EA 15 MIN 2/19/2025 Marco Kennedy, PT GP 1    09872248876 HC PT MANUAL THERAPY EA 15 MIN 2/19/2025 Marco Kennedy, PT GP 1            PT G-Codes  Outcome Measure Options: Adrian Kennedy, PT  2/19/2025

## 2025-02-26 ENCOUNTER — HOSPITAL ENCOUNTER (OUTPATIENT)
Dept: PHYSICAL THERAPY | Facility: HOSPITAL | Age: 65
Setting detail: THERAPIES SERIES
Discharge: HOME | End: 2025-02-26
Payer: COMMERCIAL

## 2025-02-26 DIAGNOSIS — M75.41 IMPINGEMENT SYNDROME OF RIGHT SHOULDER: Primary | ICD-10-CM

## 2025-02-26 PROCEDURE — 97140 MANUAL THERAPY 1/> REGIONS: CPT | Performed by: PHYSICAL THERAPIST

## 2025-02-26 PROCEDURE — 97110 THERAPEUTIC EXERCISES: CPT | Performed by: PHYSICAL THERAPIST

## 2025-02-26 NOTE — THERAPY TREATMENT NOTE
Outpatient Physical Therapy Ortho Treatment Note   Sy     Patient Name: Corby Martinez  : 1960  MRN: [id number]  Today's Date: 2025      Visit Date: 2025    Visit Dx:    ICD-10-CM ICD-9-CM   1. Impingement syndrome of right shoulder  M75.41 726.2       Patient Active Problem List   Diagnosis    Former cigarette smoker    Squamous cell carcinoma of skin of left hand    Primary hypertension    Mixed hyperlipidemia    Chronic bilateral low back pain without sciatica    Situational anxiety    Achilles tendinitis of left lower extremity    Plantar fasciitis        Past Medical History:   Diagnosis Date    Allergic     Ankle sprain 2020    Anxiety     Hyperlipidemia     Hypertension     Low back strain     Lung mass     Pneumonia         Past Surgical History:   Procedure Laterality Date    CORONARY STENT PLACEMENT      HERNIA REPAIR          PT Ortho       Row Name 25 0550       Right Upper Ext    Rt Shoulder Abduction AROM 170 degrees  -GC    Rt Shoulder Flexion AROM 162 degrees  -GC    Rt Shoulder External Rotation AROM 64 degrees  -GC    Rt Shoulder Internal Rotation AROM 72 degrees  -GC              User Key  (r) = Recorded By, (t) = Taken By, (c) = Cosigned By      Initials Name Provider Type    Sukhdeep Eason, PT Physical Therapist                                 PT Assessment/Plan       Row Name 25 0598          PT Assessment    Assessment Comments Pt is showing increased shoulder range and is reporting decreasd pain. He tolerated his exercise progression well.  -GC        PT Plan    PT Plan Comments Pt is to continue his HEP 2x daily.  -GC               User Key  (r) = Recorded By, (t) = Taken By, (c) = Cosigned By      Initials Name Provider Type    Sukhdeep Eason PT Physical Therapist                     Modalities       Row Name 25 0505             Subjective    Subjective Comments Pt states his shoudler is getting better. He no longer has the stabbing  pain in his arm.  -GC         Moist Heat    MH Applied Yes  -GC      Location right shoulder  -GC      PT Moist Heat Minutes 10  -GC      MH Prior to Rx Yes  -GC         Functional Testing    Outcome Measure Options Quick DASH  -GC                User Key  (r) = Recorded By, (t) = Taken By, (c) = Cosigned By      Initials Name Provider Type    GC Sukhdeep Anderson, PT Physical Therapist                   OP Exercises       Row Name 02/26/25 0550             Subjective    Subjective Comments Pt states his shoudler is getting better. He no longer has the stabbing pain in his arm.  -GC         Exercise 1    Exercise Name 1 sidelying ER  -GC      Reps 1 30  -GC      Time 1 2#  -GC         Exercise 2    Exercise Name 2 scaption up  -GC      Reps 2 30  -GC      Time 2 2#  -GC         Exercise 3    Exercise Name 3 scaption down  -GC      Reps 3 30  -GC      Time 3 2#  -GC         Exercise 4    Exercise Name 4 shoulder ER vs theraband  -GC      Reps 4 25  -GC      Time 4 silver  -GC         Exercise 5    Exercise Name 5 shoulder IR vs theraband  -GC      Reps 5 25  -GC      Time 5 silver  -GC         Exercise 6    Exercise Name 6 sleeper stretch  -GC      Reps 6 10  -GC      Time 6 10 secs  -GC         Exercise 7    Exercise Name 7 Prone Hor ABD 90  -GC      Reps 7 30  -GC      Time 7 2#  -GC         Exercise 8    Exercise Name 8 Prone Hor   -GC      Reps 8 30  -GC      Time 8 2#  -GC                User Key  (r) = Recorded By, (t) = Taken By, (c) = Cosigned By      Initials Name Provider Type    GC Sukhdeep Anderson, PT Physical Therapist                             Manual Rx (Last 36 Hours)       Manual Treatments       Row Name 02/26/25 0550             Manual Rx 1    Manual Rx 1 Location right shoulder  -GC      Manual Rx 1 Type GH joint mobilizations POST and INF  -GC      Manual Rx 1 Grade grade II  -GC      Manual Rx 1 Duration 5 min  -GC         Manual Rx 2    Manual Rx 2 Location right shoulder  -GC      Manual Rx  2 Type passive stretches in FLEX-ABD-ER-IR  -GC      Manual Rx 2 Duration 10 min  -GC                User Key  (r) = Recorded By, (t) = Taken By, (c) = Cosigned By      Initials Name Provider Type    GC Sukhdeep Anderson, PT Physical Therapist                             Outcome Measure Options: Quick DASH         Time Calculation:   Start Time: 0550  Stop Time: 0629  Time Calculation (min): 39 min  Untimed Charges  PT Moist Heat Minutes: 10  Total Minutes  Untimed Charges Total Minutes: 10   Total Minutes: 10  Therapy Charges for Today       Code Description Service Date Service Provider Modifiers Qty    59600070056 HC PT THER PROC EA 15 MIN 2/26/2025 Sukhdeep Anderson, PT GP 1    89455190656 HC PT MANUAL THERAPY EA 15 MIN 2/26/2025 Sukhdeep Anderson, PT GP 1            PT G-Codes  Outcome Measure Options: Quick DASH         Sukhdeep Anderson PT  2/26/2025

## 2025-03-05 ENCOUNTER — HOSPITAL ENCOUNTER (OUTPATIENT)
Dept: PHYSICAL THERAPY | Facility: HOSPITAL | Age: 65
Setting detail: THERAPIES SERIES
Discharge: HOME | End: 2025-03-05
Payer: COMMERCIAL

## 2025-03-05 DIAGNOSIS — M75.41 IMPINGEMENT SYNDROME OF RIGHT SHOULDER: Primary | ICD-10-CM

## 2025-03-05 PROCEDURE — 97110 THERAPEUTIC EXERCISES: CPT | Performed by: PHYSICAL THERAPIST

## 2025-03-05 NOTE — THERAPY RE-EVALUATION
Outpatient Physical Therapy Ortho Re-Evaluation   Sy     Patient Name: Corby Martinez  : 1960  MRN: [id number]  Today's Date: 3/5/2025      Visit Date: 2025    Patient Active Problem List   Diagnosis    Former cigarette smoker    Squamous cell carcinoma of skin of left hand    Primary hypertension    Mixed hyperlipidemia    Chronic bilateral low back pain without sciatica    Situational anxiety    Achilles tendinitis of left lower extremity    Plantar fasciitis        Past Medical History:   Diagnosis Date    Allergic     Ankle sprain 2020    Anxiety     Hyperlipidemia     Hypertension     Low back strain     Lung mass     Pneumonia         Past Surgical History:   Procedure Laterality Date    CORONARY STENT PLACEMENT      HERNIA REPAIR         Visit Dx:     ICD-10-CM ICD-9-CM   1. Impingement syndrome of right shoulder  M75.41 726.2              PT Ortho       Row Name 25 0550       MMT (Manual Muscle Testing)    General MMT Comments scaption strength is 4+/5  -       MMT Right Upper Ext    Rt Shoulder Internal Rotation MMT, Gross Movement (5/5) normal  -GC    Rt Shoulder External Rotation MMT, Gross Movement (4+/5) good plus  -GC              User Key  (r) = Recorded By, (t) = Taken By, (c) = Cosigned By      Initials Name Provider Type    Sukhdeep Eason, PT Physical Therapist                                       PT OP Goals       Row Name 25 0550          PT Short Term Goals    STG Date to Achieve 25  -     STG 1 Decrease right shoulder pain to 2-3/10 with activity.  -GC     STG 1 Progress Met  -GC     STG 2 Increase right shoulder AROM to 165 degrees FLEX, 145 degrees ABD, 60 degrees ER, and 75 degrees IR with testing.  -GC     STG 2 Progress Met  -GC     STG 3 Increase right shoulder girdle strength to at least 4+/5 all planes with testing.  -GC     STG 3 Progress Met  -GC     STG 4 Pt will be independent with his HEP issued by this therapist.  -GC     STG 4  Progress Met  -GC        Long Term Goals    LTG Date to Achieve 03/14/25  -GC     LTG 1 Decrease right shoulder pain to 0-1/10 with activity.  -GC     LTG 1 Progress Partially Met  -GC     LTG 2 Increase right shoulder AROM to WFL all planes with testing.  -GC     LTG 2 Progress Partially Met  -GC     LTG 3 Increase right shoulder girdle strength to 5/5 all planes with testing.  -GC     LTG 3 Progress Partially Met  -GC     LTG 4 Pt will be independent with all ADLs without pain.  -GC     LTG 4 Progress Partially Met  -GC               User Key  (r) = Recorded By, (t) = Taken By, (c) = Cosigned By      Initials Name Provider Type     Sukhdeep Anderson, PT Physical Therapist                     PT Assessment/Plan       Row Name 03/05/25 0550          PT Assessment    Assessment Comments Pt is doing well with increased shoulder ROM and strength with decreased c/o pain. He has progressed nicely towards his goals. Feel he can continue to progress with his HEP only.  -        PT Plan    PT Plan Comments Pt is to continue his HEP daily. He will call if problems arise. He has MD follow up 4/3.  -               User Key  (r) = Recorded By, (t) = Taken By, (c) = Cosigned By      Initials Name Provider Type     Sukhdeep Anderson, PT Physical Therapist                     Modalities       Row Name 03/05/25 0550             Subjective    Subjective Comments Pt states his shoulder is feeling pretty good.  -GC         Moist Heat    MH Applied Yes  -GC      Location right shoulder  -GC      PT Moist Heat Minutes 10  -GC      MH Prior to Rx Yes  -GC         Functional Testing    Outcome Measure Options Quick DASH  -                User Key  (r) = Recorded By, (t) = Taken By, (c) = Cosigned By      Initials Name Provider Type     Sukhdeep Anderson, PT Physical Therapist                   OP Exercises       Row Name 03/05/25 0550             Subjective    Subjective Comments Pt states his shoulder is feeling pretty good.  -          Exercise 1    Exercise Name 1 sidelying ER  -GC      Reps 1 30  -GC      Time 1 3#  -GC         Exercise 2    Exercise Name 2 scaption up  -GC      Reps 2 30  -GC      Time 2 2#  -GC         Exercise 3    Exercise Name 3 scaption down  -GC      Reps 3 30  -GC      Time 3 2#  -GC         Exercise 4    Exercise Name 4 shoulder ER vs theraband  -GC      Reps 4 25  -GC      Time 4 silver  -GC         Exercise 5    Exercise Name 5 shoulder IR vs theraband  -GC      Reps 5 25  -GC      Time 5 silver  -GC         Exercise 6    Exercise Name 6 sleeper stretch  -GC      Reps 6 10  -GC      Time 6 10 secs  -GC         Exercise 7    Exercise Name 7 Prone Hor ABD 90  -GC      Reps 7 30  -GC      Time 7 3#  -GC         Exercise 8    Exercise Name 8 Prone Hor   -GC      Reps 8 30  -GC      Time 8 3#  -GC                User Key  (r) = Recorded By, (t) = Taken By, (c) = Cosigned By      Initials Name Provider Type    GC Sukhdeep Anderson, PT Physical Therapist                  Manual Rx (Last 36 Hours)       Manual Treatments       Row Name 03/05/25 0550             Manual Rx 1    Manual Rx 1 Location right shoulder  -GC      Manual Rx 1 Type GH joint mobilizations POST and INF  -GC      Manual Rx 1 Grade grade II  -GC      Manual Rx 1 Duration 5 min  -GC         Manual Rx 2    Manual Rx 2 Location right shoulder  -GC      Manual Rx 2 Type passive stretches in FLEX-ABD-ER-IR  -GC      Manual Rx 2 Duration 10 min  -GC                User Key  (r) = Recorded By, (t) = Taken By, (c) = Cosigned By      Initials Name Provider Type    GC Sukhdeep Anderson, [place] Physical Therapist                                Outcome Measure Options: Quick DASH         Time Calculation:     Start Time: 0550  Stop Time: 0628  Time Calculation (min): 38 min  Untimed Charges  PT Moist Heat Minutes: 10  Total Minutes  Untimed Charges Total Minutes: 10   Total Minutes: 10     Therapy Charges for Today       Code Description Service Date Service Provider  Modifiers Qty    [id number] HC PT THER PROC EA 15 MIN 3/5/2025 Sukhdeep Anderson, PT GP 1            PT G-Codes  Outcome Measure Options: Quick CLAY Anderson, PT  3/5/2025

## 2025-04-03 ENCOUNTER — PREP FOR SURGERY (OUTPATIENT)
Dept: OTHER | Facility: HOSPITAL | Age: 65
End: 2025-04-03
Payer: COMMERCIAL

## 2025-04-03 DIAGNOSIS — Z12.11 COLON CANCER SCREENING: Primary | ICD-10-CM

## 2025-06-25 DIAGNOSIS — E78.2 MIXED HYPERLIPIDEMIA: Primary | ICD-10-CM

## 2025-06-25 DIAGNOSIS — Z00.00 HEALTHCARE MAINTENANCE: ICD-10-CM

## 2025-06-25 DIAGNOSIS — R73.03 PREDIABETES: ICD-10-CM

## 2025-06-25 DIAGNOSIS — I10 PRIMARY HYPERTENSION: ICD-10-CM

## 2025-06-25 DIAGNOSIS — Z12.5 SCREENING PSA (PROSTATE SPECIFIC ANTIGEN): ICD-10-CM

## 2025-07-15 ENCOUNTER — OFFICE VISIT (OUTPATIENT)
Dept: FAMILY MEDICINE CLINIC | Facility: CLINIC | Age: 65
End: 2025-07-15
Payer: MEDICARE

## 2025-07-15 VITALS
OXYGEN SATURATION: 94 % | SYSTOLIC BLOOD PRESSURE: 138 MMHG | DIASTOLIC BLOOD PRESSURE: 86 MMHG | BODY MASS INDEX: 26.07 KG/M2 | HEIGHT: 69 IN | TEMPERATURE: 97.9 F | WEIGHT: 176 LBS | HEART RATE: 75 BPM

## 2025-07-15 DIAGNOSIS — Z00.00 MEDICARE ANNUAL WELLNESS VISIT, SUBSEQUENT: Primary | ICD-10-CM

## 2025-07-15 DIAGNOSIS — E78.2 MIXED HYPERLIPIDEMIA: ICD-10-CM

## 2025-07-15 DIAGNOSIS — Z12.11 COLON CANCER SCREENING: ICD-10-CM

## 2025-07-15 DIAGNOSIS — Z87.891 FORMER CIGARETTE SMOKER: ICD-10-CM

## 2025-07-15 DIAGNOSIS — F41.8 SITUATIONAL ANXIETY: ICD-10-CM

## 2025-07-15 DIAGNOSIS — Z23 NEED FOR PNEUMOCOCCAL VACCINATION: ICD-10-CM

## 2025-07-15 DIAGNOSIS — Z87.891 PERSONAL HISTORY OF TOBACCO USE, PRESENTING HAZARDS TO HEALTH: ICD-10-CM

## 2025-07-15 DIAGNOSIS — I10 PRIMARY HYPERTENSION: ICD-10-CM

## 2025-07-15 PROCEDURE — G0439 PPPS, SUBSEQ VISIT: HCPCS | Performed by: FAMILY MEDICINE

## 2025-07-15 PROCEDURE — 90684 PCV21 VACCINE IM: CPT | Performed by: FAMILY MEDICINE

## 2025-07-15 PROCEDURE — G0009 ADMIN PNEUMOCOCCAL VACCINE: HCPCS | Performed by: FAMILY MEDICINE

## 2025-07-15 PROCEDURE — 96160 PT-FOCUSED HLTH RISK ASSMT: CPT | Performed by: FAMILY MEDICINE

## 2025-07-15 RX ORDER — ESCITALOPRAM OXALATE 10 MG/1
10 TABLET ORAL DAILY
Qty: 90 TABLET | Refills: 3 | Status: SHIPPED | OUTPATIENT
Start: 2025-07-15

## 2025-07-15 RX ORDER — MELOXICAM 15 MG/1
TABLET ORAL
Qty: 90 TABLET | Refills: 1 | Status: SHIPPED | OUTPATIENT
Start: 2025-07-15

## 2025-07-15 RX ORDER — AMLODIPINE BESYLATE 10 MG/1
10 TABLET ORAL DAILY
Qty: 90 TABLET | Refills: 3 | Status: SHIPPED | OUTPATIENT
Start: 2025-07-15

## 2025-07-15 RX ORDER — HYDROCHLOROTHIAZIDE 25 MG/1
25 TABLET ORAL DAILY
Qty: 90 TABLET | Refills: 3 | Status: SHIPPED | OUTPATIENT
Start: 2025-07-15

## 2025-07-15 RX ORDER — ECONAZOLE NITRATE 10 MG/G
1 CREAM TOPICAL
COMMUNITY
Start: 2025-07-10

## 2025-07-15 RX ORDER — LOSARTAN POTASSIUM 100 MG/1
100 TABLET ORAL DAILY
Qty: 90 TABLET | Refills: 3 | Status: SHIPPED | OUTPATIENT
Start: 2025-07-15

## 2025-07-15 RX ORDER — ROSUVASTATIN CALCIUM 20 MG/1
20 TABLET, COATED ORAL DAILY
Qty: 90 TABLET | Refills: 3 | Status: SHIPPED | OUTPATIENT
Start: 2025-07-15

## 2025-07-15 NOTE — ASSESSMENT & PLAN NOTE
Lipid abnormalities are stable    Plan:  Continue same medication/s without change.      Discussed medication dosage, use, side effects, and goals of treatment in detail.    Counseled patient on lifestyle modifications to help control hyperlipidemia.     Patient Treatment Goals:   LDL goal is less than 70  LDL goal is under 100    Followup in 6 months.    Orders:    rosuvastatin (Crestor) 20 MG tablet; Take 1 tablet by mouth Daily.

## 2025-07-15 NOTE — ASSESSMENT & PLAN NOTE
Hypertension is stable and controlled  Continue current treatment regimen.  Blood pressure will be reassessed in 6 months.    Orders:    amLODIPine (NORVASC) 10 MG tablet; Take 1 tablet by mouth Daily.    hydroCHLOROthiazide 25 MG tablet; Take 1 tablet by mouth Daily.    losartan (Cozaar) 100 MG tablet; Take 1 tablet by mouth Daily.

## 2025-07-15 NOTE — PROGRESS NOTES
Subjective   The ABCs of the Annual Wellness Visit  Medicare Wellness Visit      Corby Martinez is a 65 y.o. patient who presents for a Medicare Wellness Visit.    The following portions of the patient's history were reviewed and   updated as appropriate: allergies, current medications, past family history, past medical history, past social history, past surgical history, and problem list.    Compared to one year ago, the patient's physical   health is the same.  Compared to one year ago, the patient's mental   health is the same.    Recent Hospitalizations:  He was not admitted to the hospital during the last year.     Current Medical Providers:  Patient Care Team:  Dayton Baez MD as PCP - General (Emergency Medicine)  Colleen Lee MD as Surgeon (Thoracic Surgery)    Outpatient Medications Prior to Visit   Medication Sig Dispense Refill    econazole nitrate (SPECTAZOLE) 1 % cream Apply 1 Application topically to the appropriate area as directed.      amLODIPine (NORVASC) 10 MG tablet Take 1 tablet by mouth Daily. 90 tablet 3    escitalopram (Lexapro) 10 MG tablet Take 1 tablet by mouth Daily. 90 tablet 3    hydroCHLOROthiazide (HYDRODIURIL) 25 MG tablet Take 1 tablet by mouth Daily. 90 tablet 3    losartan (Cozaar) 100 MG tablet Take 1 tablet by mouth Daily. 90 tablet 3    meloxicam (MOBIC) 15 MG tablet 1 PO Daily with food. 14 tablet 0    rosuvastatin (Crestor) 20 MG tablet Take 1 tablet by mouth Daily. 90 tablet 3     No facility-administered medications prior to visit.     No opioid medication identified on active medication list. I have reviewed chart for other potential  high risk medication/s and harmful drug interactions in the elderly.      Aspirin is not on active medication list.  Aspirin use is not indicated based on review of current medical condition/s. Risk of harm outweighs potential benefits.  .    Patient Active Problem List   Diagnosis    Former cigarette smoker    Squamous  "cell carcinoma of skin of left hand    Primary hypertension    Mixed hyperlipidemia    Chronic bilateral low back pain without sciatica    Situational anxiety    Achilles tendinitis of left lower extremity    Plantar fasciitis    Colon cancer screening     Advance Care Planning Advance Directive is not on file.  ACP discussion was held with the patient during this visit. Patient does not have an advance directive, information provided.            Objective   Vitals:    07/15/25 1437   BP: 138/86   BP Location: Left arm   Patient Position: Sitting   Cuff Size: Large Adult   Pulse: 75   Temp: 97.9 °F (36.6 °C)   SpO2: 94%   Weight: 79.8 kg (176 lb)   Height: 175.3 cm (69.02\")   PainSc: 0-No pain       Estimated body mass index is 25.98 kg/m² as calculated from the following:    Height as of this encounter: 175.3 cm (69.02\").    Weight as of this encounter: 79.8 kg (176 lb).                Does the patient have evidence of cognitive impairment? No  Lab Results   Component Value Date    CHLPL 190 2025    TRIG 61 2025    HDL 63 (H) 2025     (H) 2025    VLDL 11 2025    HGBA1C 5.40 2025                                                                                               Health  Risk Assessment    Smoking Status:  Social History     Tobacco Use   Smoking Status Former    Current packs/day: 0.00    Average packs/day: 1 pack/day for 36.0 years (36.0 ttl pk-yrs)    Types: Cigarettes    Start date: 1975    Quit date:     Years since quittin.5   Smokeless Tobacco Never   Tobacco Comments    Former smoker quit 8 years ago.  Began smoking at age 15 at 1ppd for 36 years.     Alcohol Consumption:  Social History     Substance and Sexual Activity   Alcohol Use Yes    Alcohol/week: 18.0 standard drinks of alcohol    Types: 18 Cans of beer per week    Comment: Reports 18 beers weekly.       Fall Risk Screen  STEADI Fall Risk Assessment was completed, and patient is at LOW " risk for falls.Assessment completed on:7/15/2025    Depression Screening   Little interest or pleasure in doing things? Not at all   Feeling down, depressed, or hopeless? Not at all   PHQ-2 Total Score 0      Health Habits and Functional and Cognitive Screenin/8/2025     1:31 PM   Functional & Cognitive Status   Do you have difficulty preparing food and eating? No   Do you have difficulty bathing yourself, getting dressed or grooming yourself? No   Do you have difficulty using the toilet? No   Do you have difficulty moving around from place to place? No   Do you have trouble with steps or getting out of a bed or a chair? No   Current Diet Limited Junk Food   Dental Exam Not up to date   Eye Exam Up to date   Exercise (times per week) 3 times per week   Current Exercises Include Gardening;Light Weights;Walking;Yard Work   Do you need help using the phone?  No   Are you deaf or do you have serious difficulty hearing?  No   Do you need help to go to places out of walking distance? No   Do you need help shopping? No   Do you need help preparing meals?  No   Do you need help with housework?  No   Do you need help with laundry? No   Do you need help taking your medications? No   Do you need help managing money? No   Do you ever drive or ride in a car without wearing a seat belt? No   Have you felt unusual fatigue (could be tiredness), stress, anger or loneliness in the last month? No   Who do you live with? Spouse   If you need help, do you have trouble finding someone available to you? No   Have you been bothered in the last four weeks by sexual problems? No   Do you have difficulty concentrating, remembering or making decisions? No           Age-appropriate Screening Schedule:  Refer to the list below for future screening recommendations based on patient's age, sex and/or medical conditions. Orders for these recommended tests are listed in the plan section. The patient has been provided with a written  plan.    Health Maintenance List  Health Maintenance   Topic Date Due    COLORECTAL CANCER SCREENING  Never done    Pneumococcal Vaccine 50+ (1 of 1 - PCV) Never done    AAA SCREEN ONCE  Never done    COVID-19 Vaccine (5 - 2024-25 season) 09/01/2024    ANNUAL WELLNESS VISIT  07/10/2025    LUNG CANCER SCREENING  07/23/2025    INFLUENZA VACCINE  10/01/2025    LIPID PANEL  07/07/2026    TDAP/TD VACCINES (3 - Td or Tdap) 12/07/2031    HEPATITIS C SCREENING  Completed    ZOSTER VACCINE  Completed                                                                                                                                                CMS Preventative Services Quick Reference  Risk Factors Identified During Encounter  None Identified    The above risks/problems have been discussed with the patient.  Pertinent information has been shared with the patient in the After Visit Summary.  An After Visit Summary and PPPS were made available to the patient.    Follow Up:   Next Medicare Wellness visit to be scheduled in 1 year.     Assessment & Plan  Medicare annual wellness visit, subsequent  Reviewed health maintenance.  Reviewed previous labs.       Primary hypertension  Hypertension is stable and controlled  Continue current treatment regimen.  Blood pressure will be reassessed in 6 months.    Orders:    amLODIPine (NORVASC) 10 MG tablet; Take 1 tablet by mouth Daily.    hydroCHLOROthiazide 25 MG tablet; Take 1 tablet by mouth Daily.    losartan (Cozaar) 100 MG tablet; Take 1 tablet by mouth Daily.    Situational anxiety  Stable.  Continue Lexapro 10 mg daily.  Orders:    escitalopram (Lexapro) 10 MG tablet; Take 1 tablet by mouth Daily.    Mixed hyperlipidemia   Lipid abnormalities are stable    Plan:  Continue same medication/s without change.      Discussed medication dosage, use, side effects, and goals of treatment in detail.    Counseled patient on lifestyle modifications to help control hyperlipidemia.     Patient  Treatment Goals:   LDL goal is less than 70  LDL goal is under 100    Followup in 6 months.    Orders:    rosuvastatin (Crestor) 20 MG tablet; Take 1 tablet by mouth Daily.    Colon cancer screening  Will refer to GI.  Orders:    Ambulatory Referral to Gastroenterology    Former cigarette smoker    Orders:    CT Chest Low Dose WO; Future    Personal history of tobacco use, presenting hazards to health    Orders:    CT Chest Low Dose WO; Future    Need for pneumococcal vaccination  Pneumococcal vaccine 21 given in office.  Orders:    Pneumococcal Conjugate Vaccine 21 (18+ yrs)       Lab Results   Component Value Date    HGBA1C 5.40 07/07/2025     Lab Results   Component Value Date    WBC 7.62 07/07/2025    HGB 13.7 07/07/2025    HCT 42.3 07/07/2025    MCV 95.1 07/07/2025     07/07/2025     Lab Results   Component Value Date    CHLPL 190 07/07/2025    TRIG 61 07/07/2025    HDL 63 (H) 07/07/2025     (H) 07/07/2025     Lab Results   Component Value Date    GLUCOSE 96 07/07/2025    BUN 13.0 07/07/2025    CREATININE 0.91 07/07/2025     07/07/2025    K 4.6 07/07/2025     07/07/2025    CALCIUM 9.3 07/07/2025    PROTEINTOT 6.7 07/07/2025    ALBUMIN 4.2 07/07/2025    ALT 15 07/07/2025    AST 21 07/07/2025    ALKPHOS 70 07/07/2025    BILITOT 0.7 07/07/2025    GLOB 2.5 07/07/2025    AGRATIO 1.7 07/07/2025    BCR 14.3 07/07/2025    ANIONGAP 13.2 12/04/2023    EGFR 93.0 07/07/2025         Follow Up:   Return in about 6 months (around 1/15/2026), or HTN, for Recheck.

## 2025-08-15 ENCOUNTER — HOSPITAL ENCOUNTER (OUTPATIENT)
Dept: CT IMAGING | Facility: HOSPITAL | Age: 65
Discharge: HOME | End: 2025-08-15
Admitting: FAMILY MEDICINE
Payer: COMMERCIAL

## 2025-08-15 DIAGNOSIS — Z87.891 PERSONAL HISTORY OF TOBACCO USE, PRESENTING HAZARDS TO HEALTH: ICD-10-CM

## 2025-08-15 DIAGNOSIS — Z87.891 FORMER CIGARETTE SMOKER: ICD-10-CM

## 2025-08-15 PROCEDURE — 71271 CT THORAX LUNG CANCER SCR C-: CPT
